# Patient Record
Sex: FEMALE | Race: WHITE | NOT HISPANIC OR LATINO
[De-identification: names, ages, dates, MRNs, and addresses within clinical notes are randomized per-mention and may not be internally consistent; named-entity substitution may affect disease eponyms.]

---

## 2022-01-26 ENCOUNTER — NON-APPOINTMENT (OUTPATIENT)
Age: 47
End: 2022-01-26

## 2022-01-27 ENCOUNTER — APPOINTMENT (OUTPATIENT)
Dept: BREAST CENTER | Facility: CLINIC | Age: 47
End: 2022-01-27
Payer: COMMERCIAL

## 2022-01-27 VITALS
HEART RATE: 81 BPM | WEIGHT: 207 LBS | DIASTOLIC BLOOD PRESSURE: 98 MMHG | SYSTOLIC BLOOD PRESSURE: 163 MMHG | BODY MASS INDEX: 31.37 KG/M2 | HEIGHT: 68 IN

## 2022-01-27 DIAGNOSIS — Z78.9 OTHER SPECIFIED HEALTH STATUS: ICD-10-CM

## 2022-01-27 DIAGNOSIS — Z80.8 FAMILY HISTORY OF MALIGNANT NEOPLASM OF OTHER ORGANS OR SYSTEMS: ICD-10-CM

## 2022-01-27 DIAGNOSIS — Z83.3 FAMILY HISTORY OF DIABETES MELLITUS: ICD-10-CM

## 2022-01-27 PROCEDURE — 99205 OFFICE O/P NEW HI 60 MIN: CPT

## 2022-01-29 PROBLEM — Z83.3 FAMILY HISTORY OF DIABETES MELLITUS: Status: ACTIVE | Noted: 2022-01-27

## 2022-01-29 PROBLEM — Z80.8 FAMILY HISTORY OF NEUROBLASTOMA: Status: ACTIVE | Noted: 2022-01-29

## 2022-01-29 PROBLEM — Z78.9 CONSUMES ALCOHOL WEEKLY: Status: ACTIVE | Noted: 2022-01-27

## 2022-01-29 PROBLEM — Z80.8 FAMILY HISTORY OF MALIGNANT NEOPLASM OF BRAIN: Status: ACTIVE | Noted: 2022-01-27

## 2022-01-29 PROBLEM — Z78.9 DOES NOT USE ILLICIT DRUGS: Status: ACTIVE | Noted: 2022-01-27

## 2022-02-03 ENCOUNTER — APPOINTMENT (OUTPATIENT)
Dept: PLASTIC SURGERY | Facility: CLINIC | Age: 47
End: 2022-02-03
Payer: COMMERCIAL

## 2022-02-03 VITALS — TEMPERATURE: 98.4 F | HEIGHT: 68 IN | WEIGHT: 206 LBS | BODY MASS INDEX: 31.22 KG/M2

## 2022-02-03 PROCEDURE — 99204 OFFICE O/P NEW MOD 45 MIN: CPT

## 2022-02-08 ENCOUNTER — APPOINTMENT (OUTPATIENT)
Dept: BREAST CENTER | Facility: CLINIC | Age: 47
End: 2022-02-08
Payer: COMMERCIAL

## 2022-02-08 VITALS
DIASTOLIC BLOOD PRESSURE: 93 MMHG | HEART RATE: 69 BPM | HEIGHT: 68 IN | WEIGHT: 206 LBS | BODY MASS INDEX: 31.22 KG/M2 | SYSTOLIC BLOOD PRESSURE: 141 MMHG

## 2022-02-08 PROCEDURE — 99214 OFFICE O/P EST MOD 30 MIN: CPT

## 2022-02-14 ENCOUNTER — RESULT REVIEW (OUTPATIENT)
Age: 47
End: 2022-02-14

## 2022-02-14 ENCOUNTER — OUTPATIENT (OUTPATIENT)
Dept: OUTPATIENT SERVICES | Facility: HOSPITAL | Age: 47
LOS: 1 days | End: 2022-02-14
Payer: COMMERCIAL

## 2022-02-14 ENCOUNTER — APPOINTMENT (OUTPATIENT)
Dept: MRI IMAGING | Facility: CLINIC | Age: 47
End: 2022-02-14
Payer: COMMERCIAL

## 2022-02-14 DIAGNOSIS — C50.912 MALIGNANT NEOPLASM OF UNSPECIFIED SITE OF LEFT FEMALE BREAST: ICD-10-CM

## 2022-02-14 PROCEDURE — 88321 CONSLTJ&REPRT SLD PREP ELSWR: CPT

## 2022-02-14 PROCEDURE — 72198 MR ANGIO PELVIS W/O & W/DYE: CPT | Mod: 26

## 2022-02-14 PROCEDURE — C8902: CPT

## 2022-02-14 PROCEDURE — A9585: CPT

## 2022-02-14 PROCEDURE — C8920: CPT

## 2022-02-14 PROCEDURE — 74185 MRA ABD W OR W/O CNTRST: CPT | Mod: 26

## 2022-02-15 DIAGNOSIS — C50.912 MALIGNANT NEOPLASM OF UNSPECIFIED SITE OF LEFT FEMALE BREAST: ICD-10-CM

## 2022-02-21 ENCOUNTER — TRANSCRIPTION ENCOUNTER (OUTPATIENT)
Age: 47
End: 2022-02-21

## 2022-02-24 ENCOUNTER — APPOINTMENT (OUTPATIENT)
Dept: BREAST CENTER | Facility: CLINIC | Age: 47
End: 2022-02-24
Payer: COMMERCIAL

## 2022-02-24 VITALS — DIASTOLIC BLOOD PRESSURE: 89 MMHG | SYSTOLIC BLOOD PRESSURE: 148 MMHG

## 2022-02-24 VITALS
BODY MASS INDEX: 30.16 KG/M2 | SYSTOLIC BLOOD PRESSURE: 150 MMHG | DIASTOLIC BLOOD PRESSURE: 86 MMHG | HEART RATE: 65 BPM | WEIGHT: 199 LBS | HEIGHT: 68 IN

## 2022-02-24 PROCEDURE — 99213 OFFICE O/P EST LOW 20 MIN: CPT

## 2022-02-24 NOTE — PHYSICAL EXAM
[Normocephalic] : normocephalic [Sclera nonicteric] : sclera nonicteric [Examined in the supine and seated position] : examined in the supine and seated position [Bra Size: ___] : Bra Size: [unfilled] [No dominant masses] : no dominant masses in right breast  [No Nipple Retraction] : no left nipple retraction [No Nipple Discharge] : no left nipple discharge [No Axillary Lymphadenopathy] : no left axillary lymphadenopathy [No Edema] : no edema [No Rashes] : no rashes [No Ulceration] : no ulceration [de-identified] : No suspicious dermal lesions on exam, including at L breast 3:00 N9 where there was MRI enhancement of the skin [de-identified] : Slight bulge visible from 2 cm mobile lump in UOQ close to but not involving the skin. No warmth or tenderness or erythema

## 2022-02-24 NOTE — DATA REVIEWED
[FreeTextEntry1] : I have independently reviewed the reports and the images. \par \par B/l mammogram and US 1/11/22\par - heterogenously dense\par - 2 cm mass at area of concern in L UOQ; correlates to a 1.8 x 1.3 x 2.1 cm nodule at 1:00 N10; US bx\par - benign R intramammary node\par - R breast cyst\par - no findings in L axilla\par - BIRADS 4\par \par US needle bx 1/13/22\par - L breast 1:00, q clip = infiltrating ductal carcinoma , 6/9, ER 90%, [place] 73%, Her2 0\par \par Breast MRI 2/1/22\par - 3 cm nonmass enhancement in R breast 9:00 N9; MR bx\par - 0.6 cm enhancing mass in R breast 7:00 N6; MR bx\par - 2 x 2 x 3 cm enhancement in L breast 1:00 N9 at biopsied cancer\par - 0.3 cm enhancement in L breast skin at 3:00 N9; clinical exam\par - questionable L axillary nodes; L axillary  US

## 2022-02-24 NOTE — HISTORY OF PRESENT ILLNESS
[FreeTextEntry1] : Ms. Colby is a 46 year old woman here for a followup for a newly diagnosed left breast infiltrating ductal carcinoma and abnormal MRI findings in the right breast. She woke up in November after a dream and felt a lump in the left outer breast. Earlier this week, the left breast lump seemed to be larger and was red, and since then the swelling and redness have subsided.  \par \par Her genetic testing is pending with the provider where it was drawn. Her family history is not significant for any breast cancer.

## 2022-02-25 NOTE — HISTORY OF PRESENT ILLNESS
[FreeTextEntry1] : Ms. Colby is a 46 year old woman here for a 3rd opinion on a newly diagnosed left breast infiltrating ductal carcinoma. She woke up in November after a dream and felt a lump in the left outer breast. No other breast lumps, nipple discharge or skin changes. \par \par Her family history is not significant for any breast cancer.

## 2022-02-25 NOTE — PAST MEDICAL HISTORY
[Menarche Age ____] : age at menarche was [unfilled] [Total Preg ___] : G[unfilled] [Live Births ___] : P[unfilled]  [Age At Live Birth ___] : Age at live birth: [unfilled] [History of Hormone Replacement Treatment] : has no history of hormone replacement treatment [FreeTextEntry8] : 6 yr

## 2022-02-25 NOTE — DATA REVIEWED
[FreeTextEntry1] : I have independently reviewed the reports and the images. \par \par B/l mammogram and US 1/11/22\par - heterogenously dense\par - 2 cm mass at area of concern in L UOQ; correlates to a 1.8 x 1.3 x 2.1 cm nodule at 1:00 N10; US bx\par - benign R intramammary node\par - R breast cyst\par - no findings in L axilla\par - BIRADS 4\par \par US needle bx 1/13/22\par - L breast 1:00, q clip = infiltrating ductal carcinoma , 6/9, ER 90%, [place] 73%, Her2 0

## 2022-02-25 NOTE — PHYSICAL EXAM
[Normocephalic] : normocephalic [Sclera nonicteric] : sclera nonicteric [Supple] : supple [No Supraclavicular Adenopathy] : no supraclavicular adenopathy [No Cervical Adenopathy] : no cervical adenopathy [Clear to Auscultation Bilat] : clear to auscultation bilaterally [Normal Sinus Rhythm] : normal sinus rhythm [No Rubs] : no pericardial rub [Examined in the supine and seated position] : examined in the supine and seated position [Bra Size: ___] : Bra Size: [unfilled] [No dominant masses] : no dominant masses in right breast  [No Nipple Retraction] : no left nipple retraction [No Nipple Discharge] : no left nipple discharge [No Axillary Lymphadenopathy] : no left axillary lymphadenopathy [Soft] : abdomen soft [No Edema] : no edema [No Rashes] : no rashes [No Ulceration] : no ulceration [de-identified] : 2 cm mass in UOQ

## 2022-02-25 NOTE — HISTORY OF PRESENT ILLNESS
[FreeTextEntry1] : Ms. Colby is a 46 year old woman here for a followup for a newly diagnosed left breast infiltrating ductal carcinoma and abnormal MRI findings in the right breast. She woke up in November after a dream and felt a lump in the left outer breast. No other breast lumps, nipple discharge or skin changes. Since the last office visit, she has met with Dr. Ramirez and decided on AMINA flap reconstruction. Her breast MRI was completed, and a left axillary US will be scheduled. \par \par Her genetic testing is pending with the provider where it was drawn; it should be back in the next week or two. Her family history is not significant for any breast cancer.

## 2022-02-25 NOTE — DATA REVIEWED
[FreeTextEntry1] : I have independently reviewed the reports and the images. \par \par B/l mammogram and US 1/11/22\par - heterogenously dense\par - 2 cm mass at area of concern in L UOQ; correlates to a 1.8 x 1.3 x 2.1 cm nodule at 1:00 N10; US bx\par - benign R intramammary node\par - R breast cyst\par - no findings in L axilla\par - BIRADS 4\par \par US needle bx 1/13/22\par - L breast 1:00, q clip = infiltrating ductal carcinoma , 6/9, ER 90%, [place] 73%, Her2 0\par \par Breast MRI 2/1/22\par - 3 cm nonmass enhancement in R breast 9:00 N9; MR bx\par - 0.6 cm enhancing mass in R breast 7:00 N6; MR bx\par - 2 x 2 x 3 cm enhancement in L breast 1:00 N9 at biopsied cancer\par - 0.3 cm enhancement in L breast skin at 3:00 N9; clinical exam\par - questionable L axillary nodes; L axillary  US

## 2022-02-25 NOTE — CONSULT LETTER
[Dear  ___] : Dear  [unfilled], [Consult Letter:] : I had the pleasure of evaluating your patient, [unfilled]. [Please see my note below.] : Please see my note below. [Consult Closing:] : Thank you very much for allowing me to participate in the care of this patient.  If you have any questions, please do not hesitate to contact me. [Sincerely,] : Sincerely, [DrTutu  ___] : Dr. KNIGHT [FreeTextEntry3] : Lissa Mederos MD FACS

## 2022-02-25 NOTE — PHYSICAL EXAM
[Normocephalic] : normocephalic [Sclera nonicteric] : sclera nonicteric [Supple] : supple [No Supraclavicular Adenopathy] : no supraclavicular adenopathy [No Cervical Adenopathy] : no cervical adenopathy [Clear to Auscultation Bilat] : clear to auscultation bilaterally [Normal Sinus Rhythm] : normal sinus rhythm [No Rubs] : no pericardial rub [Examined in the supine and seated position] : examined in the supine and seated position [Bra Size: ___] : Bra Size: [unfilled] [No dominant masses] : no dominant masses in right breast  [No dominant masses] : no dominant masses left breast [No Nipple Retraction] : no left nipple retraction [No Nipple Discharge] : no left nipple discharge [No Axillary Lymphadenopathy] : no left axillary lymphadenopathy [Soft] : abdomen soft [No Edema] : no edema [No Rashes] : no rashes [No Ulceration] : no ulceration [de-identified] : No suspicious dermal lesions on exam, including at L breast 3:00 N9 where there was MRI enhancement of the skin [de-identified] : 2 cm mass in UOQ

## 2022-03-02 NOTE — ASSESSMENT
[FreeTextEntry1] : The patient was counseled about reconstructive options following mastectomy, including autologus, prosthetic, and the options of foregoing reconstruction.  Additionally, she was explained the options regarding the timing of reconstruction, including immediate reconstruction at the time of mastectomy or delayed reconstruction at a later date. \par \par The patient was extensively counseled on the operation and the perioperative recoveries of both autologous and prosthetic reconstructions.  The patient understands the risks with abdominally based microsurgical reconstruction including partial or total flap loss, revisit to the operating room in the dao-operative period, wound dehiscence, mastectomy skin flap necrosis, fat necrosis, abdominal wall morbidity (laxity, bulge, hernia), asymmetry, parasthesia, seroma, hematoma, and infection.  She also understands the risks with implant-based reconstruction including infection, implant malposition, extrusion, deflation, capsular contracture, mastectomy skin flap necrosis, wound dehiscence, asymmetry, seroma, parasthesia, and hematoma. \par \par The patient understands all of these risks and she provides informed consent.\par \par The plan as of now is for bilateral AMINA flap reconstruction

## 2022-03-02 NOTE — PHYSICAL EXAM
[NI] : Normal [de-identified] : please see scanned in breast sheet\par SN-N: L - 31, R - 32\par N-IMF: L - 19, R - 18\par BW: L/R - 15\par grade 3 ptosis b/l  [de-identified] : C cup tissue

## 2022-03-02 NOTE — HISTORY OF PRESENT ILLNESS
[FreeTextEntry1] : Ms. NIKKO KOVACS  is a 46 year  old female  with no pertinent past medical history who presents with newly diagnosed left breast cancer which was found on self exam in November.  Pt had mammogram 1/11/22, and later confirmed IDC with ultrasound guided core biopsy 1/13/22  pt was referred to the office by Dr Mederos  to discuss her reconstructive options. \par \par smoking status: no smoker\par anticoagulation: none\par history of bleeding disorder: negative \par family history of breast cancer: negative\par \par At this time pt is considering bilateral mastectomy\par 
37.1

## 2022-03-07 ENCOUNTER — OUTPATIENT (OUTPATIENT)
Dept: OUTPATIENT SERVICES | Facility: HOSPITAL | Age: 47
LOS: 1 days | End: 2022-03-07
Payer: COMMERCIAL

## 2022-03-07 DIAGNOSIS — Z98.890 OTHER SPECIFIED POSTPROCEDURAL STATES: Chronic | ICD-10-CM

## 2022-03-07 DIAGNOSIS — Z01.818 ENCOUNTER FOR OTHER PREPROCEDURAL EXAMINATION: ICD-10-CM

## 2022-03-07 DIAGNOSIS — K08.409 PARTIAL LOSS OF TEETH, UNSPECIFIED CAUSE, UNSPECIFIED CLASS: Chronic | ICD-10-CM

## 2022-03-07 DIAGNOSIS — C50.912 MALIGNANT NEOPLASM OF UNSPECIFIED SITE OF LEFT FEMALE BREAST: ICD-10-CM

## 2022-03-07 LAB
ALBUMIN SERPL ELPH-MCNC: 4 G/DL — SIGNIFICANT CHANGE UP (ref 3.3–5)
ALP SERPL-CCNC: 79 U/L — SIGNIFICANT CHANGE UP (ref 40–120)
ALT FLD-CCNC: 16 U/L — SIGNIFICANT CHANGE UP (ref 12–78)
ANION GAP SERPL CALC-SCNC: 4 MMOL/L — LOW (ref 5–17)
APPEARANCE UR: CLEAR — SIGNIFICANT CHANGE UP
APTT BLD: 31.9 SEC — SIGNIFICANT CHANGE UP (ref 27.5–35.5)
AST SERPL-CCNC: 9 U/L — LOW (ref 15–37)
BASOPHILS # BLD AUTO: 0.05 K/UL — SIGNIFICANT CHANGE UP (ref 0–0.2)
BASOPHILS NFR BLD AUTO: 1 % — SIGNIFICANT CHANGE UP (ref 0–2)
BILIRUB SERPL-MCNC: 0.4 MG/DL — SIGNIFICANT CHANGE UP (ref 0.2–1.2)
BILIRUB UR-MCNC: NEGATIVE — SIGNIFICANT CHANGE UP
BUN SERPL-MCNC: 10 MG/DL — SIGNIFICANT CHANGE UP (ref 7–23)
CALCIUM SERPL-MCNC: 9.3 MG/DL — SIGNIFICANT CHANGE UP (ref 8.5–10.1)
CHLORIDE SERPL-SCNC: 111 MMOL/L — HIGH (ref 96–108)
CO2 SERPL-SCNC: 26 MMOL/L — SIGNIFICANT CHANGE UP (ref 22–31)
COLOR SPEC: YELLOW — SIGNIFICANT CHANGE UP
CREAT SERPL-MCNC: 0.98 MG/DL — SIGNIFICANT CHANGE UP (ref 0.5–1.3)
DIFF PNL FLD: NEGATIVE — SIGNIFICANT CHANGE UP
EGFR: 73 ML/MIN/1.73M2 — SIGNIFICANT CHANGE UP
EOSINOPHIL # BLD AUTO: 0.15 K/UL — SIGNIFICANT CHANGE UP (ref 0–0.5)
EOSINOPHIL NFR BLD AUTO: 2.9 % — SIGNIFICANT CHANGE UP (ref 0–6)
GLUCOSE SERPL-MCNC: 90 MG/DL — SIGNIFICANT CHANGE UP (ref 70–99)
GLUCOSE UR QL: NEGATIVE — SIGNIFICANT CHANGE UP
HCT VFR BLD CALC: 42.9 % — SIGNIFICANT CHANGE UP (ref 34.5–45)
HGB BLD-MCNC: 14.6 G/DL — SIGNIFICANT CHANGE UP (ref 11.5–15.5)
IMM GRANULOCYTES NFR BLD AUTO: 0.2 % — SIGNIFICANT CHANGE UP (ref 0–1.5)
INR BLD: 1.03 RATIO — SIGNIFICANT CHANGE UP (ref 0.88–1.16)
KETONES UR-MCNC: NEGATIVE — SIGNIFICANT CHANGE UP
LEUKOCYTE ESTERASE UR-ACNC: NEGATIVE — SIGNIFICANT CHANGE UP
LYMPHOCYTES # BLD AUTO: 2.15 K/UL — SIGNIFICANT CHANGE UP (ref 1–3.3)
LYMPHOCYTES # BLD AUTO: 41.3 % — SIGNIFICANT CHANGE UP (ref 13–44)
MCHC RBC-ENTMCNC: 31.7 PG — SIGNIFICANT CHANGE UP (ref 27–34)
MCHC RBC-ENTMCNC: 34 GM/DL — SIGNIFICANT CHANGE UP (ref 32–36)
MCV RBC AUTO: 93.1 FL — SIGNIFICANT CHANGE UP (ref 80–100)
MONOCYTES # BLD AUTO: 0.31 K/UL — SIGNIFICANT CHANGE UP (ref 0–0.9)
MONOCYTES NFR BLD AUTO: 6 % — SIGNIFICANT CHANGE UP (ref 2–14)
NEUTROPHILS # BLD AUTO: 2.54 K/UL — SIGNIFICANT CHANGE UP (ref 1.8–7.4)
NEUTROPHILS NFR BLD AUTO: 48.6 % — SIGNIFICANT CHANGE UP (ref 43–77)
NITRITE UR-MCNC: NEGATIVE — SIGNIFICANT CHANGE UP
PH UR: 6.5 — SIGNIFICANT CHANGE UP (ref 5–8)
PLATELET # BLD AUTO: 352 K/UL — SIGNIFICANT CHANGE UP (ref 150–400)
POTASSIUM SERPL-MCNC: 3.9 MMOL/L — SIGNIFICANT CHANGE UP (ref 3.5–5.3)
POTASSIUM SERPL-SCNC: 3.9 MMOL/L — SIGNIFICANT CHANGE UP (ref 3.5–5.3)
PROT SERPL-MCNC: 7.5 GM/DL — SIGNIFICANT CHANGE UP (ref 6–8.3)
PROT UR-MCNC: NEGATIVE — SIGNIFICANT CHANGE UP
PROTHROM AB SERPL-ACNC: 12 SEC — SIGNIFICANT CHANGE UP (ref 10.5–13.4)
RBC # BLD: 4.61 M/UL — SIGNIFICANT CHANGE UP (ref 3.8–5.2)
RBC # FLD: 11.9 % — SIGNIFICANT CHANGE UP (ref 10.3–14.5)
SODIUM SERPL-SCNC: 141 MMOL/L — SIGNIFICANT CHANGE UP (ref 135–145)
SP GR SPEC: 1.01 — SIGNIFICANT CHANGE UP (ref 1.01–1.02)
UROBILINOGEN FLD QL: NEGATIVE — SIGNIFICANT CHANGE UP
WBC # BLD: 5.21 K/UL — SIGNIFICANT CHANGE UP (ref 3.8–10.5)
WBC # FLD AUTO: 5.21 K/UL — SIGNIFICANT CHANGE UP (ref 3.8–10.5)

## 2022-03-07 PROCEDURE — 85730 THROMBOPLASTIN TIME PARTIAL: CPT

## 2022-03-07 PROCEDURE — 86900 BLOOD TYPING SEROLOGIC ABO: CPT

## 2022-03-07 PROCEDURE — 86901 BLOOD TYPING SEROLOGIC RH(D): CPT

## 2022-03-07 PROCEDURE — 81003 URINALYSIS AUTO W/O SCOPE: CPT

## 2022-03-07 PROCEDURE — 93010 ELECTROCARDIOGRAM REPORT: CPT

## 2022-03-07 PROCEDURE — 85025 COMPLETE CBC W/AUTO DIFF WBC: CPT

## 2022-03-07 PROCEDURE — 93005 ELECTROCARDIOGRAM TRACING: CPT

## 2022-03-07 PROCEDURE — 36415 COLL VENOUS BLD VENIPUNCTURE: CPT

## 2022-03-07 PROCEDURE — 80053 COMPREHEN METABOLIC PANEL: CPT

## 2022-03-07 PROCEDURE — 86850 RBC ANTIBODY SCREEN: CPT

## 2022-03-07 PROCEDURE — 85610 PROTHROMBIN TIME: CPT

## 2022-03-07 NOTE — CHART NOTE - NSCHARTNOTEFT_GEN_A_CORE
45 y/o female presents to PST for scheduled bilateral mastectomy with DR. DARNELL and AMINA reconstruction with Dr. Millan. Dr Ramirez on 3/14/22.    vs 65 hr b/p 152/85 temp 98.6    cbc, bmp, type and screen, ptt/inr done today in PST     DCIS   Pre op and chlorhexidine instructions given and explained.  Avoid NSAIDs and OTC supplements.   Patient verbalized understanding  medical consult requested by surgeon 3/1/22  covid 19 swab scheduled 3/11/22  , advised to quarantine after test     HTN   continue to amlodipine

## 2022-03-08 DIAGNOSIS — C50.912 MALIGNANT NEOPLASM OF UNSPECIFIED SITE OF LEFT FEMALE BREAST: ICD-10-CM

## 2022-03-08 DIAGNOSIS — Z01.818 ENCOUNTER FOR OTHER PREPROCEDURAL EXAMINATION: ICD-10-CM

## 2022-03-11 PROBLEM — I10 ESSENTIAL (PRIMARY) HYPERTENSION: Chronic | Status: ACTIVE | Noted: 2022-03-07

## 2022-03-11 PROBLEM — D05.12 INTRADUCTAL CARCINOMA IN SITU OF LEFT BREAST: Chronic | Status: ACTIVE | Noted: 2022-03-07

## 2022-03-11 NOTE — PAST MEDICAL HISTORY
[Menarche Age ____] : age at menarche was [unfilled] [History of Hormone Replacement Treatment] : has no history of hormone replacement treatment [Total Preg ___] : G[unfilled] [Live Births ___] : P[unfilled]  [Age At Live Birth ___] : Age at live birth: [unfilled] [FreeTextEntry8] : 6 yr

## 2022-03-11 NOTE — HISTORY OF PRESENT ILLNESS
[FreeTextEntry1] : Ms. Colby is a 46 year old woman here for a surgical treatment for left breast infiltrating ductal carcinoma and abnormal MRI findings in the right breast. She woke up in November after a dream and felt a lump in the left outer breast. \par \par Her genetic panel testing is negative. Her family history is not significant for any breast cancer.

## 2022-03-11 NOTE — PHYSICAL EXAM
[Normocephalic] : normocephalic [Sclera nonicteric] : sclera nonicteric [Supple] : supple [No Supraclavicular Adenopathy] : no supraclavicular adenopathy [No Cervical Adenopathy] : no cervical adenopathy [Clear to Auscultation Bilat] : clear to auscultation bilaterally [Normal Sinus Rhythm] : normal sinus rhythm [Examined in the supine and seated position] : examined in the supine and seated position [Bra Size: ___] : Bra Size: [unfilled] [No dominant masses] : no dominant masses in right breast  [No Nipple Retraction] : no left nipple retraction [No Nipple Discharge] : no left nipple discharge [No Axillary Lymphadenopathy] : no left axillary lymphadenopathy [Soft] : abdomen soft [No Edema] : no edema [No Rashes] : no rashes [No Ulceration] : no ulceration [de-identified] : No suspicious dermal lesions on exam, including at L breast 3:00 N9 where there was MRI enhancement of the skin [de-identified] : Slight bulge visible from 2 cm mobile lump in UOQ close to but not involving the skin. No warmth or tenderness or erythema

## 2022-03-14 ENCOUNTER — RESULT REVIEW (OUTPATIENT)
Age: 47
End: 2022-03-14

## 2022-03-14 ENCOUNTER — APPOINTMENT (OUTPATIENT)
Dept: PLASTIC SURGERY | Facility: HOSPITAL | Age: 47
End: 2022-03-14
Payer: COMMERCIAL

## 2022-03-14 ENCOUNTER — NON-APPOINTMENT (OUTPATIENT)
Age: 47
End: 2022-03-14

## 2022-03-14 ENCOUNTER — INPATIENT (INPATIENT)
Facility: HOSPITAL | Age: 47
LOS: 1 days | Discharge: HOME | DRG: 580 | End: 2022-03-16
Attending: SURGERY | Admitting: SURGERY
Payer: COMMERCIAL

## 2022-03-14 ENCOUNTER — APPOINTMENT (OUTPATIENT)
Dept: BREAST CENTER | Facility: HOSPITAL | Age: 47
End: 2022-03-14

## 2022-03-14 VITALS
RESPIRATION RATE: 16 BRPM | DIASTOLIC BLOOD PRESSURE: 82 MMHG | WEIGHT: 199.08 LBS | HEIGHT: 68 IN | HEART RATE: 73 BPM | OXYGEN SATURATION: 100 % | SYSTOLIC BLOOD PRESSURE: 127 MMHG | TEMPERATURE: 98 F

## 2022-03-14 DIAGNOSIS — Z98.890 OTHER SPECIFIED POSTPROCEDURAL STATES: Chronic | ICD-10-CM

## 2022-03-14 DIAGNOSIS — I10 ESSENTIAL (PRIMARY) HYPERTENSION: ICD-10-CM

## 2022-03-14 DIAGNOSIS — C50.912 MALIGNANT NEOPLASM OF UNSPECIFIED SITE OF LEFT FEMALE BREAST: ICD-10-CM

## 2022-03-14 DIAGNOSIS — C50.812 MALIGNANT NEOPLASM OF OVERLAPPING SITES OF LEFT FEMALE BREAST: ICD-10-CM

## 2022-03-14 DIAGNOSIS — C77.3 SECONDARY AND UNSPECIFIED MALIGNANT NEOPLASM OF AXILLA AND UPPER LIMB LYMPH NODES: ICD-10-CM

## 2022-03-14 DIAGNOSIS — K08.409 PARTIAL LOSS OF TEETH, UNSPECIFIED CAUSE, UNSPECIFIED CLASS: Chronic | ICD-10-CM

## 2022-03-14 LAB — HCG UR QL: NEGATIVE — SIGNIFICANT CHANGE UP

## 2022-03-14 PROCEDURE — S2068: CPT | Mod: 82,RT

## 2022-03-14 PROCEDURE — 81025 URINE PREGNANCY TEST: CPT

## 2022-03-14 PROCEDURE — 19303 MAST SIMPLE COMPLETE: CPT | Mod: 50

## 2022-03-14 PROCEDURE — 38530 BIOPSY/REMOVAL LYMPH NODES: CPT | Mod: RT

## 2022-03-14 PROCEDURE — 21600 PARTIAL REMOVAL OF RIB: CPT | Mod: RT,59

## 2022-03-14 PROCEDURE — S2068: CPT | Mod: LT

## 2022-03-14 PROCEDURE — 21600 PARTIAL REMOVAL OF RIB: CPT | Mod: LT,59

## 2022-03-14 PROCEDURE — 88307 TISSUE EXAM BY PATHOLOGIST: CPT

## 2022-03-14 PROCEDURE — 38530 BIOPSY/REMOVAL LYMPH NODES: CPT | Mod: LT

## 2022-03-14 PROCEDURE — 15777 ACELLULAR DERM MATRIX IMPLT: CPT | Mod: RT

## 2022-03-14 PROCEDURE — 38525 BIOPSY/REMOVAL LYMPH NODES: CPT | Mod: 50

## 2022-03-14 PROCEDURE — C1781: CPT

## 2022-03-14 PROCEDURE — 85027 COMPLETE CBC AUTOMATED: CPT

## 2022-03-14 PROCEDURE — 80048 BASIC METABOLIC PNL TOTAL CA: CPT

## 2022-03-14 PROCEDURE — 99024 POSTOP FOLLOW-UP VISIT: CPT

## 2022-03-14 PROCEDURE — C9399: CPT

## 2022-03-14 PROCEDURE — 88333 PATH CONSLTJ SURG CYTO XM 1: CPT

## 2022-03-14 PROCEDURE — 88333 PATH CONSLTJ SURG CYTO XM 1: CPT | Mod: 26

## 2022-03-14 PROCEDURE — A9520: CPT

## 2022-03-14 PROCEDURE — 38792 RA TRACER ID OF SENTINL NODE: CPT | Mod: 50,59

## 2022-03-14 PROCEDURE — 38900 IO MAP OF SENT LYMPH NODE: CPT | Mod: 50

## 2022-03-14 PROCEDURE — 15777 ACELLULAR DERM MATRIX IMPLT: CPT | Mod: LT

## 2022-03-14 PROCEDURE — S2068: CPT | Mod: RT

## 2022-03-14 PROCEDURE — C1889: CPT

## 2022-03-14 PROCEDURE — 88305 TISSUE EXAM BY PATHOLOGIST: CPT

## 2022-03-14 PROCEDURE — 88305 TISSUE EXAM BY PATHOLOGIST: CPT | Mod: 26

## 2022-03-14 PROCEDURE — 36415 COLL VENOUS BLD VENIPUNCTURE: CPT

## 2022-03-14 PROCEDURE — 99261: CPT

## 2022-03-14 PROCEDURE — 88307 TISSUE EXAM BY PATHOLOGIST: CPT | Mod: 26

## 2022-03-14 RX ORDER — HYDROMORPHONE HYDROCHLORIDE 2 MG/ML
0.5 INJECTION INTRAMUSCULAR; INTRAVENOUS; SUBCUTANEOUS EVERY 4 HOURS
Refills: 0 | Status: DISCONTINUED | OUTPATIENT
Start: 2022-03-14 | End: 2022-03-16

## 2022-03-14 RX ORDER — SODIUM CHLORIDE 9 MG/ML
1000 INJECTION, SOLUTION INTRAVENOUS
Refills: 0 | Status: DISCONTINUED | OUTPATIENT
Start: 2022-03-14 | End: 2022-03-16

## 2022-03-14 RX ORDER — ACETAMINOPHEN 500 MG
650 TABLET ORAL EVERY 6 HOURS
Refills: 0 | Status: COMPLETED | OUTPATIENT
Start: 2022-03-14 | End: 2023-02-10

## 2022-03-14 RX ORDER — SENNA PLUS 8.6 MG/1
2 TABLET ORAL AT BEDTIME
Refills: 0 | Status: DISCONTINUED | OUTPATIENT
Start: 2022-03-14 | End: 2022-03-16

## 2022-03-14 RX ORDER — ACETAMINOPHEN 500 MG
1000 TABLET ORAL EVERY 6 HOURS
Refills: 0 | Status: COMPLETED | OUTPATIENT
Start: 2022-03-14 | End: 2022-03-15

## 2022-03-14 RX ORDER — KETOROLAC TROMETHAMINE 30 MG/ML
10 SYRINGE (ML) INJECTION EVERY 6 HOURS
Refills: 0 | Status: DISCONTINUED | OUTPATIENT
Start: 2022-03-14 | End: 2022-03-14

## 2022-03-14 RX ORDER — CEFAZOLIN SODIUM 1 G
2000 VIAL (EA) INJECTION EVERY 8 HOURS
Refills: 0 | Status: COMPLETED | OUTPATIENT
Start: 2022-03-15 | End: 2022-03-15

## 2022-03-14 RX ORDER — FENTANYL CITRATE 50 UG/ML
50 INJECTION INTRAVENOUS
Refills: 0 | Status: DISCONTINUED | OUTPATIENT
Start: 2022-03-14 | End: 2022-03-15

## 2022-03-14 RX ORDER — SODIUM CHLORIDE 9 MG/ML
1000 INJECTION, SOLUTION INTRAVENOUS
Refills: 0 | Status: DISCONTINUED | OUTPATIENT
Start: 2022-03-14 | End: 2022-03-15

## 2022-03-14 RX ORDER — OXYCODONE HYDROCHLORIDE 5 MG/1
10 TABLET ORAL EVERY 4 HOURS
Refills: 0 | Status: DISCONTINUED | OUTPATIENT
Start: 2022-03-14 | End: 2022-03-16

## 2022-03-14 RX ORDER — OXYCODONE HYDROCHLORIDE 5 MG/1
5 TABLET ORAL EVERY 4 HOURS
Refills: 0 | Status: DISCONTINUED | OUTPATIENT
Start: 2022-03-14 | End: 2022-03-16

## 2022-03-14 RX ORDER — KETOROLAC TROMETHAMINE 30 MG/ML
15 SYRINGE (ML) INJECTION EVERY 6 HOURS
Refills: 0 | Status: DISCONTINUED | OUTPATIENT
Start: 2022-03-14 | End: 2022-03-16

## 2022-03-14 RX ORDER — ONDANSETRON 8 MG/1
4 TABLET, FILM COATED ORAL EVERY 6 HOURS
Refills: 0 | Status: DISCONTINUED | OUTPATIENT
Start: 2022-03-14 | End: 2022-03-16

## 2022-03-14 RX ORDER — INFLUENZA VIRUS VACCINE 15; 15; 15; 15 UG/.5ML; UG/.5ML; UG/.5ML; UG/.5ML
0.5 SUSPENSION INTRAMUSCULAR ONCE
Refills: 0 | Status: COMPLETED | OUTPATIENT
Start: 2022-03-14 | End: 2022-03-14

## 2022-03-14 RX ORDER — HEPARIN SODIUM 5000 [USP'U]/ML
5000 INJECTION INTRAVENOUS; SUBCUTANEOUS EVERY 8 HOURS
Refills: 0 | Status: DISCONTINUED | OUTPATIENT
Start: 2022-03-14 | End: 2022-03-16

## 2022-03-14 RX ORDER — ONDANSETRON 8 MG/1
4 TABLET, FILM COATED ORAL ONCE
Refills: 0 | Status: DISCONTINUED | OUTPATIENT
Start: 2022-03-14 | End: 2022-03-15

## 2022-03-14 RX ORDER — OXYCODONE HYDROCHLORIDE 5 MG/1
10 TABLET ORAL ONCE
Refills: 0 | Status: DISCONTINUED | OUTPATIENT
Start: 2022-03-14 | End: 2022-03-14

## 2022-03-14 RX ADMIN — Medication 15 MILLIGRAM(S): at 22:14

## 2022-03-14 RX ADMIN — OXYCODONE HYDROCHLORIDE 10 MILLIGRAM(S): 5 TABLET ORAL at 20:00

## 2022-03-14 RX ADMIN — HEPARIN SODIUM 5000 UNIT(S): 5000 INJECTION INTRAVENOUS; SUBCUTANEOUS at 22:14

## 2022-03-14 RX ADMIN — Medication 400 MILLIGRAM(S): at 22:14

## 2022-03-14 RX ADMIN — OXYCODONE HYDROCHLORIDE 10 MILLIGRAM(S): 5 TABLET ORAL at 21:00

## 2022-03-14 RX ADMIN — SODIUM CHLORIDE 75 MILLILITER(S): 9 INJECTION, SOLUTION INTRAVENOUS at 18:07

## 2022-03-14 NOTE — BRIEF OPERATIVE NOTE - NSICDXBRIEFOPLAUNCH_GEN_ALL_CORE
<--- Click to Launch ICDx for PreOp, PostOp and Procedure
<--- Click to Launch ICDx for PreOp, PostOp and Procedure
There are no Wet Read(s) to document.

## 2022-03-14 NOTE — PATIENT PROFILE ADULT - FALL HARM RISK - UNIVERSAL INTERVENTIONS
Bed in lowest position, wheels locked, appropriate side rails in place/Call bell, personal items and telephone in reach/Instruct patient to call for assistance before getting out of bed or chair/Non-slip footwear when patient is out of bed/[place] to call system/Physically safe environment - no spills, clutter or unnecessary equipment/Purposeful Proactive Rounding/Room/bathroom lighting operational, light cord in reach

## 2022-03-14 NOTE — BRIEF OPERATIVE NOTE - SPECIMENS
R mastectomy, R axillary sentinel node #1 hot and magnetic, #2 hot and magnetic; L mastectomy, L UOQ anterior margin, L UOQ posterior margin, L axillary sentinel node #1 hot and magnetic, #2 hot R mastectomy, R LOQ posterior margin, R axillary sentinel node #1 hot and magnetic, #2 hot and magnetic; L mastectomy, L UOQ anterior margin, L UOQ posterior margin, L axillary sentinel node #1 hot and magnetic, #2 hot

## 2022-03-14 NOTE — BRIEF OPERATIVE NOTE - NSICDXBRIEFPROCEDURE_GEN_ALL_CORE_FT
PROCEDURES:  Bilat simple mastectomy 14-Mar-2022 13:16:26  Au, Lissa  Biopsy of both axilla 14-Mar-2022 13:17:15  Au, Lissa  Intraoperative sentinel node mapping 14-Mar-2022 13:17:41  Au, Lissa  [place] lymph node mapping 14-Mar-2022 13:18:26  Au, Lissa  
PROCEDURES:  AMINA flap, free 14-Mar-2022 16:58:53  Patricia Galvan

## 2022-03-14 NOTE — BRIEF OPERATIVE NOTE - NSICDXBRIEFPREOP_GEN_ALL_CORE_FT
PRE-OP DIAGNOSIS:  Infiltrating ductal carcinoma of left breast 14-Mar-2022 13:16:39  Lissa Mederos  
PRE-OP DIAGNOSIS:  Abnormal MRI, breast 14-Mar-2022 13:16:47  Lissa Mederos  Infiltrating ductal carcinoma of left breast 14-Mar-2022 13:16:39  Lissa Mederos

## 2022-03-14 NOTE — BRIEF OPERATIVE NOTE - OPERATION/FINDINGS
L mastectomy is 1083 g, and R mastectomy is 1083 g L mastectomy is 1083 g, and R mastectomy is 1083 g. Intraop consult showed the L axillary sentinel nodes #1 and #2 to be negative on touch prep.

## 2022-03-14 NOTE — BRIEF OPERATIVE NOTE - NSICDXBRIEFPOSTOP_GEN_ALL_CORE_FT
POST-OP DIAGNOSIS:  Infiltrating ductal carcinoma of left breast 14-Mar-2022 13:16:54  Lissa Mederos  Abnormal MRI, breast 14-Mar-2022 13:16:50  Lissa Mederos  
POST-OP DIAGNOSIS:  Infiltrating ductal carcinoma of left breast 14-Mar-2022 13:16:54  Lissa Mederos

## 2022-03-15 DIAGNOSIS — C50.919 MALIGNANT NEOPLASM OF UNSPECIFIED SITE OF UNSPECIFIED FEMALE BREAST: ICD-10-CM

## 2022-03-15 LAB
ANION GAP SERPL CALC-SCNC: 6 MMOL/L — SIGNIFICANT CHANGE UP (ref 5–17)
BUN SERPL-MCNC: 12 MG/DL — SIGNIFICANT CHANGE UP (ref 7–23)
CALCIUM SERPL-MCNC: 8.4 MG/DL — LOW (ref 8.5–10.1)
CHLORIDE SERPL-SCNC: 109 MMOL/L — HIGH (ref 96–108)
CO2 SERPL-SCNC: 24 MMOL/L — SIGNIFICANT CHANGE UP (ref 22–31)
CREAT SERPL-MCNC: 0.81 MG/DL — SIGNIFICANT CHANGE UP (ref 0.5–1.3)
EGFR: 92 ML/MIN/1.73M2 — SIGNIFICANT CHANGE UP
GLUCOSE SERPL-MCNC: 104 MG/DL — HIGH (ref 70–99)
HCT VFR BLD CALC: 34.7 % — SIGNIFICANT CHANGE UP (ref 34.5–45)
HGB BLD-MCNC: 11.6 G/DL — SIGNIFICANT CHANGE UP (ref 11.5–15.5)
MCHC RBC-ENTMCNC: 32.3 PG — SIGNIFICANT CHANGE UP (ref 27–34)
MCHC RBC-ENTMCNC: 33.4 GM/DL — SIGNIFICANT CHANGE UP (ref 32–36)
MCV RBC AUTO: 96.7 FL — SIGNIFICANT CHANGE UP (ref 80–100)
PLATELET # BLD AUTO: 257 K/UL — SIGNIFICANT CHANGE UP (ref 150–400)
POTASSIUM SERPL-MCNC: 3.9 MMOL/L — SIGNIFICANT CHANGE UP (ref 3.5–5.3)
POTASSIUM SERPL-SCNC: 3.9 MMOL/L — SIGNIFICANT CHANGE UP (ref 3.5–5.3)
RBC # BLD: 3.59 M/UL — LOW (ref 3.8–5.2)
RBC # FLD: 11.9 % — SIGNIFICANT CHANGE UP (ref 10.3–14.5)
SODIUM SERPL-SCNC: 139 MMOL/L — SIGNIFICANT CHANGE UP (ref 135–145)
WBC # BLD: 10.79 K/UL — HIGH (ref 3.8–10.5)
WBC # FLD AUTO: 10.79 K/UL — HIGH (ref 3.8–10.5)

## 2022-03-15 PROCEDURE — 12345: CPT | Mod: NC

## 2022-03-15 RX ORDER — ACETAMINOPHEN 500 MG
650 TABLET ORAL EVERY 6 HOURS
Refills: 0 | Status: DISCONTINUED | OUTPATIENT
Start: 2022-03-15 | End: 2022-03-16

## 2022-03-15 RX ADMIN — Medication 400 MILLIGRAM(S): at 15:43

## 2022-03-15 RX ADMIN — Medication 400 MILLIGRAM(S): at 09:16

## 2022-03-15 RX ADMIN — HEPARIN SODIUM 5000 UNIT(S): 5000 INJECTION INTRAVENOUS; SUBCUTANEOUS at 20:41

## 2022-03-15 RX ADMIN — Medication 100 MILLIGRAM(S): at 00:10

## 2022-03-15 RX ADMIN — OXYCODONE HYDROCHLORIDE 5 MILLIGRAM(S): 5 TABLET ORAL at 20:45

## 2022-03-15 RX ADMIN — OXYCODONE HYDROCHLORIDE 5 MILLIGRAM(S): 5 TABLET ORAL at 21:15

## 2022-03-15 RX ADMIN — SENNA PLUS 2 TABLET(S): 8.6 TABLET ORAL at 20:41

## 2022-03-15 RX ADMIN — Medication 100 MILLIGRAM(S): at 08:20

## 2022-03-15 RX ADMIN — Medication 400 MILLIGRAM(S): at 04:19

## 2022-03-15 RX ADMIN — Medication 15 MILLIGRAM(S): at 04:19

## 2022-03-15 RX ADMIN — HEPARIN SODIUM 5000 UNIT(S): 5000 INJECTION INTRAVENOUS; SUBCUTANEOUS at 06:19

## 2022-03-15 RX ADMIN — Medication 15 MILLIGRAM(S): at 17:19

## 2022-03-15 RX ADMIN — HEPARIN SODIUM 5000 UNIT(S): 5000 INJECTION INTRAVENOUS; SUBCUTANEOUS at 13:53

## 2022-03-15 RX ADMIN — SENNA PLUS 2 TABLET(S): 8.6 TABLET ORAL at 06:19

## 2022-03-15 RX ADMIN — SODIUM CHLORIDE 75 MILLILITER(S): 9 INJECTION, SOLUTION INTRAVENOUS at 15:43

## 2022-03-15 RX ADMIN — Medication 650 MILLIGRAM(S): at 20:41

## 2022-03-15 RX ADMIN — OXYCODONE HYDROCHLORIDE 5 MILLIGRAM(S): 5 TABLET ORAL at 14:05

## 2022-03-15 RX ADMIN — Medication 15 MILLIGRAM(S): at 10:28

## 2022-03-15 RX ADMIN — Medication 1000 MILLIGRAM(S): at 09:46

## 2022-03-16 ENCOUNTER — TRANSCRIPTION ENCOUNTER (OUTPATIENT)
Age: 47
End: 2022-03-16

## 2022-03-16 VITALS
OXYGEN SATURATION: 97 % | RESPIRATION RATE: 18 BRPM | HEART RATE: 75 BPM | SYSTOLIC BLOOD PRESSURE: 127 MMHG | TEMPERATURE: 98 F | DIASTOLIC BLOOD PRESSURE: 72 MMHG

## 2022-03-16 PROCEDURE — 99223 1ST HOSP IP/OBS HIGH 75: CPT

## 2022-03-16 RX ADMIN — Medication 15 MILLIGRAM(S): at 00:09

## 2022-03-16 RX ADMIN — Medication 1 DROP(S): at 11:59

## 2022-03-16 RX ADMIN — Medication 650 MILLIGRAM(S): at 09:48

## 2022-03-16 RX ADMIN — Medication 15 MILLIGRAM(S): at 11:59

## 2022-03-16 RX ADMIN — HEPARIN SODIUM 5000 UNIT(S): 5000 INJECTION INTRAVENOUS; SUBCUTANEOUS at 04:43

## 2022-03-16 RX ADMIN — Medication 650 MILLIGRAM(S): at 04:42

## 2022-03-16 RX ADMIN — Medication 650 MILLIGRAM(S): at 10:08

## 2022-03-16 RX ADMIN — Medication 1 DROP(S): at 04:43

## 2022-03-16 RX ADMIN — Medication 15 MILLIGRAM(S): at 08:37

## 2022-03-16 NOTE — DISCHARGE NOTE PROVIDER - NSDCFUADDINST_GEN_ALL_CORE_FT
Dressings: Leave dressings intact.     Bathing: Do not get dressings wet for 48 hours.  After 48 hours you may shower, do not scrub directly on the incisions. No dressing is required after shower, pat the area dry gently. Secure drains on a lanyard or a necklace to keep from dangling while showering    Drains: Empty and record the drain amount from each drain separately. Write down Date, Time, and Amount for each drain separately on a piece of paper and bring the paper to the office at your follow up visit.     Activity: No heavy lifting, or strenuous activity, you may walk for exercise    Diet: No change    Meds: You should alternate taking Tylenol and Motrin for pain ever 4-6 hours.  Take Oxycodone for severe pain only.     Follow up: call the office to make a follow up appointment in 1 week

## 2022-03-16 NOTE — DISCHARGE NOTE PROVIDER - NSDCMRMEDTOKEN_GEN_ALL_CORE_FT
amLODIPine 5 mg oral tablet: 1 tab(s) orally once a day  Vitamin D3 400 intl units (10 mcg) oral tablet: 1 tab(s) orally once a day

## 2022-03-16 NOTE — DISCHARGE NOTE PROVIDER - NSDCCPTREATMENT_GEN_ALL_CORE_FT
PRINCIPAL PROCEDURE  Procedure: Skin sparing mastectomy  Findings and Treatment:       SECONDARY PROCEDURE  Procedure: AMINA flap, free  Findings and Treatment:

## 2022-03-16 NOTE — PROGRESS NOTE ADULT - SUBJECTIVE AND OBJECTIVE BOX
47 yo F POD#2 Bilateral mastectomies and AMINA reconstruction, doing well.  Seen by Dr. Ramirez this am.  No complaints overnight. Denies f/c/n/v/SOB/CP. Incisional/abd pain controlled with oral analgesics. Voiding. No BM yet but +Flatus. Vioptix stable overnight and dc'd this am by Dr. Ramirez. Pt expresses she would like to go home today.    Vital Signs Last 24 Hrs  T(C): 36.7 (16 Mar 2022 00:19), Max: 36.8 (15 Mar 2022 15:36)  T(F): 98 (16 Mar 2022 00:19), Max: 98.2 (15 Mar 2022 15:36)  HR: 67 (16 Mar 2022 00:19) (57 - 70)  BP: 106/57 (16 Mar 2022 00:19) (82/52 - 106/57)  BP(mean): --  RR: 18 (16 Mar 2022 00:19) (17 - 18)  SpO2: 96% (16 Mar 2022 00:19) (95% - 98%)    General: INAD A&Ox3  Lungs CTA B/l  Chest: B/l flaps viable and soft. Small region of ecchymosis noted in R breast along vertical incision line  CV: RRR  Abd: soft, incision c/d/i with parinio   ext: nonedematous    A&P: 47 yo F POD#2 bilateral mastectomies and AMINA  ADAT  OOB encourage ambulation  dispo planning home today with OP follow up with Dr. Mederos and Dr. Ramirez  DVT ppx  Pain control  
Pt seen and examined. C/o some pain above right breast.  Denies SOB/CP/N/V.  Laith diet and + flatus     Vital Signs Last 24 Hrs  T(C): 36.8 (15 Mar 2022 07:53), Max: 37.7 (15 Mar 2022 00:32)  T(F): 98.2 (15 Mar 2022 07:53), Max: 99.8 (15 Mar 2022 00:32)  HR: 57 (15 Mar 2022 11:19) (57 - 87)  BP: 101/57 (15 Mar 2022 11:19) (82/52 - 121/64)  BP(mean): --  RR: 17 (15 Mar 2022 11:19) (13 - 19)  SpO2: 98% (15 Mar 2022 11:19) (94% - 99%)    I&O's Summary    14 Mar 2022 07:01  -  15 Mar 2022 07:00  --------------------------------------------------------  IN: 3350 mL / OUT: 1178 mL / NET: 2172 mL    15 Mar 2022 07:01  -  15 Mar 2022 12:37  --------------------------------------------------------  IN: 0 mL / OUT: 618 mL / NET: -618 mL        Physical Exam  Gen: NAD, A&Ox3  Pulm: CTA bilat, no rales, rhonchi or wheezes, No respiratory distress, no subcostal retractions  CV: RRR, S1S2 normal, no JVD  Breast:  Wound: C/D/I, some ecchymosis or No hematoma noted, Bilateral flaps viable. some tenderness above right nipple.             R breast J{P 44cc.       L breast  RONY - 60cc             R Vioptix 54% signal 96,   L Vioptix  57%, signal 98  Abd: Soft, NT, ND, +BS,          Wound: C/D/I,  R abdominal RONY - 15cc,  L abdominal RONY:  28cc  : Morales out, pt voided 100cc                          11.6   10.79 )-----------( 257      ( 15 Mar 2022 06:16 )             34.7       03-15    139  |  109<H>  |  12  ----------------------------<  104<H>  3.9   |  24  |  0.81    Ca    8.4<L>      15 Mar 2022 06:16        A/P: 46y Female s/p BIlat simple mastectomy/ AMINA free flap  DVT prophylaxis/OOB  Incentive spirometry  Strict I&O's  Analgesia and antiemetics as needed  Adv. Diet  AM labs  
Ms. Colby is POD#1 s/p b/l mastectomy, sentinel node biopsy, AMINA flaps. She is doing well. She has already been out of bed and ambulated to the bathroom today. Pain is under control    Vital Signs Last 24 Hrs  T(C): 36.8 (15 Mar 2022 07:53), Max: 37.7 (15 Mar 2022 00:32)  T(F): 98.2 (15 Mar 2022 07:53), Max: 99.8 (15 Mar 2022 00:32)  HR: 66 (15 Mar 2022 07:53) (66 - 87)  BP: 101/50 (15 Mar 2022 07:53) (98/61 - 121/64)  BP(mean): --  RR: 16 (15 Mar 2022 07:53) (13 - 19)  SpO2: 97% (15 Mar 2022 07:53) (94% - 99%)    I&O's Detail    14 Mar 2022 07:01  -  15 Mar 2022 07:00  --------------------------------------------------------  IN:    Lactated Ringers: 150 mL    Other (mL): 3200 mL  Total IN: 3350 mL    OUT:    Bulb (mL): 85 mL    Bulb (mL): 44 mL    Bulb (mL): 90 mL    Bulb (mL): 39 mL    Indwelling Catheter - Urethral (mL): 500 mL    Oral Fluid: 0 mL    Other (mL): 320 mL    Voided (mL): 100 mL  Total OUT: 1178 mL    Total NET: 2172 mL      NAD  Chest - incisions intact, mastectomy flaps soft, AMINA flap with vioptix at 56, drain output serosanguinous  Abd - incision intact, drain output serosanguinous                          11.6   10.79 )-----------( 257      ( 15 Mar 2022 06:16 )             34.7   03-15    139  |  109<H>  |  12  ----------------------------<  104<H>  3.9   |  24  |  0.81    Ca    8.4<L>      15 Mar 2022 06:16    
Ms. Colby is POD#2 s/p b/l mastectomy, sentinel node biopsy, AMINA flaps. She is doing well. She has ambulated and is tolerating diet. She feels ready to go home.    Vital Signs Last 24 Hrs  T(C): 36.6 (16 Mar 2022 13:08), Max: 36.8 (15 Mar 2022 15:36)  T(F): 97.9 (16 Mar 2022 13:08), Max: 98.2 (15 Mar 2022 15:36)  HR: 75 (16 Mar 2022 13:08) (67 - 84)  BP: 127/72 (16 Mar 2022 13:08) (105/60 - 137/84)  BP(mean): --  RR: 18 (16 Mar 2022 13:08) (17 - 18)  SpO2: 97% (16 Mar 2022 13:08) (95% - 97%)    I&O's Detail    15 Mar 2022 07:01  -  16 Mar 2022 07:00  --------------------------------------------------------  IN:  Total IN: 0 mL    OUT:    Bulb (mL): 98 mL    Bulb (mL): 12 mL    Bulb (mL): 140 mL    Bulb (mL): 137 mL    Voided (mL): 1200 mL  Total OUT: 1587 mL    Total NET: -1587 mL      16 Mar 2022 07:01  -  16 Mar 2022 14:38  --------------------------------------------------------  IN:  Total IN: 0 mL    OUT:    Bulb (mL): 40 mL    Bulb (mL): 6 mL    Bulb (mL): 25 mL    Bulb (mL): 43 mL  Total OUT: 114 mL    Total NET: -114 mL      NAD  Chest - incisions intact, mastectomy flaps soft, patch of superficial epidermolysis in lower inner aspect, AMINA flap viable, drain output serosanguinous  Abd - incision intact, drain output serosanguinous
Patient is a 46y old  Female who presents with a chief complaint of Infiltrating ductal carcinoma of left breast    Post-op check: S/P Bilat simple mastectomy, Biopsy of both axilla, Intraoperative sentinel node mapping, and AMINA free flap    Pt is awake and alert without complaints.  Pain is well controlled    PAST MEDICAL & SURGICAL HISTORY:  HTN (hypertension)    Ductal carcinoma in situ (DCIS) of left breast    [place] teeth extracted    S/P biopsy  vulva    MEDICATIONS  (STANDING):  acetaminophen     Tablet .. 650 milliGRAM(s) Oral every 6 hours  acetaminophen   IVPB .. 1000 milliGRAM(s) IV Intermittent every 6 hours  heparin   Injectable 5000 Unit(s) SubCutaneous every 8 hours  influenza   Vaccine 0.5 milliLiter(s) IntraMuscular once  ketorolac   Injectable 15 milliGRAM(s) IV Push every 6 hours  lactated ringers. 1000 milliLiter(s) (75 mL/Hr) IV Continuous <Continuous>  lactated ringers. 1000 milliLiter(s) (75 mL/Hr) IV Continuous <Continuous>  senna 2 Tablet(s) Oral at bedtime      PHYSICAL EXAM:  T(C): 36.3 (03-14-22 @ 16:53), Max: 36.6 (03-14-22 @ 06:11)  HR: 87 (03-14-22 @ 21:00) (73 - 87)  BP: 104/58 (03-14-22 @ 21:00) (99/58 - 127/82)  RR: 15 (03-14-22 @ 21:00) (13 - 18)  SpO2: 97% (03-14-22 @ 21:00) (94% - 100%)  Wt(kg): --    Vioptix:  R: StO2=58% Signal Quality=89               L: StO2=55% Signal Quality=98  RONY ouput 5-9pm=  #1 Right breast=56cc                             #2 Right Abdomen=35cc                             #3 Left Abdomen=22cc                             #4 Left Breast=54cc  Urine output:  5-2zm=581hr    Gen:  Pt is awake and alert, in NAD    Skin:  warm and dry    Respiratory: Lungs clear and equal bilat, no r/r/w    Cardiovascular:  S1S2 reg, no M    Breasts:  symmetrical bilateral, flaps viable, incisions clean and dry, no ecchymosis or hematoma noted, soft to touch, RONY's bilateral with small amount of dark blood    Abd:  incisions clean and dry, no ecchymosis or hematoma noted, non-distended, +BS, soft, RONY's bilaterally with small amount of dark blood    Extremities:  venodynes bilaterally, no c/c/e    Vascular:  2+/4+ bilateral    Neurological:  A & O X3, CNII-XII grossly intact

## 2022-03-16 NOTE — DISCHARGE NOTE NURSING/CASE MANAGEMENT/SOCIAL WORK - PATIENT PORTAL LINK FT
You can access the FollowMyHealth Patient Portal offered by [facility] by registering at the following website: [email or web address]. By joining [employer]’s FollowMyHealth portal, you will also be able to view your health information using other applications (apps) compatible with our system.

## 2022-03-16 NOTE — DISCHARGE NOTE PROVIDER - NSDCFUSCHEDAPPT_GEN_ALL_CORE_FT
NIKKO KOVACS ; 03/22/2022 ; NPP Med Breast [address]  NIKKO KOVACS ; 03/24/2022 ; NPP Plast Surg [address] [facility]

## 2022-03-16 NOTE — PROGRESS NOTE ADULT - NSPROGADDITIONALINFOA_GEN_ALL_CORE
Patient seen on rounds this morning 7:30 am.  Patient doing well.  Pain controlled.  Flap exams normal.  Abdomen normal.  Drainage serosanguinous.  No sign of seroma/hematoma.    Small amount of right inferior pole mastectomy skin flap ecchymosis.    Plan for discharge today.

## 2022-03-16 NOTE — DISCHARGE NOTE NURSING/CASE MANAGEMENT/SOCIAL WORK - NSDCPEFALRISK_GEN_ALL_CORE
For information on Fall & Injury Prevention, visit: [email or web address] OR  [email or web address] OR  https://www.cdc.gov/steadi/patient.html

## 2022-03-16 NOTE — DISCHARGE NOTE PROVIDER - CARE PROVIDER_API CALL
Aren Ramirez)  Plastic Surgery; Surgery; Surgery of the Hand  [address] [facility], [address]  [place] [address]  Phone: [phone]  Fax: [phone]  Scheduled Appointment: 03/24/2022

## 2022-03-16 NOTE — PROGRESS NOTE ADULT - ASSESSMENT
- multimodal pain control  - ambulate with assistance  - DVT and GI prophylaxis
Ass:  S/P Bilat simple mastectomy, Biopsy of both axilla, Intraoperative sentinel node mapping, and AMINA free flap  Plan:  Continue to Monitor in PACU, continue present management
- multimodal pain control  - drain care teaching  - d/c home   - followup in office as previously scheduled

## 2022-03-17 ENCOUNTER — NON-APPOINTMENT (OUTPATIENT)
Age: 47
End: 2022-03-17

## 2022-03-22 ENCOUNTER — APPOINTMENT (OUTPATIENT)
Dept: BREAST CENTER | Facility: CLINIC | Age: 47
End: 2022-03-22
Payer: COMMERCIAL

## 2022-03-22 VITALS
HEIGHT: 68 IN | WEIGHT: 199 LBS | BODY MASS INDEX: 30.16 KG/M2 | SYSTOLIC BLOOD PRESSURE: 140 MMHG | DIASTOLIC BLOOD PRESSURE: 93 MMHG | HEART RATE: 88 BPM

## 2022-03-22 PROCEDURE — 99024 POSTOP FOLLOW-UP VISIT: CPT

## 2022-03-22 RX ORDER — OXYCODONE 5 MG/1
5 TABLET ORAL
Qty: 10 | Refills: 0 | Status: DISCONTINUED | COMMUNITY
Start: 2022-03-10 | End: 2022-03-22

## 2022-03-23 NOTE — PHYSICAL EXAM
[Normocephalic] : normocephalic [Sclera nonicteric] : sclera nonicteric [Examined in the supine and seated position] : examined in the supine and seated position [No dominant masses] : no dominant masses in right breast  [Soft] : abdomen soft [No Edema] : no edema [No dominant masses] : no dominant masses left breast [de-identified] : Circumareolar incision with vertical extension is intact. AMINA flap viable. Superficial epidermolysis in lower inner aspect with mild surrounding erythema and drainage. Drain output serosanguinous [de-identified] : Circumareolar incision with vertical extension is intact. AMINA flap viable. Blue suture marking the prior area of the cancer is removed. Drain output serosanguinous [de-identified] : Transverse incision intact. Drain output serosanguinous. R abdominal drain with <20 ml/day output is removed.

## 2022-03-23 NOTE — CONSULT LETTER
[Dear  ___] : Dear  [unfilled], [Courtesy Letter:] : I had the pleasure of seeing your patient, [unfilled], in my office today. [Please see my note below.] : Please see my note below. [Consult Closing:] : Thank you very much for allowing me to participate in the care of this patient.  If you have any questions, please do not hesitate to contact me. [Sincerely,] : Sincerely, [FreeTextEntry3] : Lissa Mederos MD FACS

## 2022-03-23 NOTE — DATA REVIEWED
[FreeTextEntry1] : I have independently reviewed the reports and the images. \par \par B/l mammogram and US 1/11/22\par - heterogenously dense\par - 2 cm mass at area of concern in L UOQ; correlates to a 1.8 x 1.3 x 2.1 cm nodule at 1:00 N10; US bx\par - benign R intramammary node\par - R breast cyst\par - no findings in L axilla\par - BIRADS 4\par \par US needle bx 1/13/22\par - L breast 1:00, q clip = infiltrating ductal carcinoma , 6/9, ER 90%, NV 73%, Her2 0\par \par Breast MRI 2/1/22\par - 3 cm nonmass enhancement in R breast 9:00 N9; MR bx\par - 0.6 cm enhancing mass in R breast 7:00 N6; MR bx\par - 2 x 2 x 3 cm enhancement in L breast 1:00 N9 at biopsied cancer\par - 0.3 cm enhancement in L breast skin at 3:00 N9; clinical exam\par - questionable L axillary nodes; L axillary  US\par \par L limited US 2/17/22\par - normal L axillary nodes\par \par Surgical pathology 3/14/22\par - R mastectomy is negative; 0/2 R sln\par - L mastectomy = 2.1 cm tumor; 0/2 L sln\par \par Oncotype test (sent from biopsy, report 2/11/22)\par - Oncotype score = 51

## 2022-03-23 NOTE — HISTORY OF PRESENT ILLNESS
[FreeTextEntry1] : Ms. Colby is a 46 year old woman here for a postop visit s/p b/l mastectomy, sentinel node biopsy, AMINA reconstruction. Postoperatively she is using only Tylenol and ibuprofen for pain control. She is walking around. There is an area by the right mastectomy incision that has blistered. No fevers. \par \par Her genetic panel testing is negative. Her family history is not significant for any breast cancer.

## 2022-03-24 ENCOUNTER — APPOINTMENT (OUTPATIENT)
Dept: PLASTIC SURGERY | Facility: CLINIC | Age: 47
End: 2022-03-24
Payer: COMMERCIAL

## 2022-03-24 VITALS — SYSTOLIC BLOOD PRESSURE: 123 MMHG | DIASTOLIC BLOOD PRESSURE: 76 MMHG | HEART RATE: 104 BPM | OXYGEN SATURATION: 97 %

## 2022-03-24 PROCEDURE — 99024 POSTOP FOLLOW-UP VISIT: CPT

## 2022-03-24 NOTE — PHYSICAL EXAM
[NI] : Normal [de-identified] : b/l breast flaps soft and symmetrical\par skin paddles viable \par incisions c/d/i\par right breast with vertical incision surrounding superficial epidermolysis and blistering, mild erythema surrounding \par b/l lower pole breast edema\par drains in place and functioning\par no palpable fluid collections [de-identified] : abdomen soft and non tender\par umbilicus viable\par incisions c/d/i\par drain in place and functioning

## 2022-03-24 NOTE — ASSESSMENT
[FreeTextEntry1] : 47 yo female s/p b/l skin sparing mastectomy with reconstruction using AMINA flap and alloderm 3/14/22\par pt doing well\par pt seen and examined with Dr. Ramirez\par discussed leaving the drains in for 1 more week\par monitor for redness, swelling, fever, chills\par no heavy lifting or strenuous activity\par continue to apply bacitracin daily to right breast and take antibiotics as prescribed by Dr. Mederos until completed\par all pt questions answered\par

## 2022-03-30 ENCOUNTER — APPOINTMENT (OUTPATIENT)
Dept: PLASTIC SURGERY | Facility: CLINIC | Age: 47
End: 2022-03-30
Payer: COMMERCIAL

## 2022-03-30 VITALS
DIASTOLIC BLOOD PRESSURE: 86 MMHG | OXYGEN SATURATION: 98 % | BODY MASS INDEX: 30.16 KG/M2 | TEMPERATURE: 97.3 F | SYSTOLIC BLOOD PRESSURE: 133 MMHG | HEIGHT: 68 IN | WEIGHT: 199 LBS | HEART RATE: 78 BPM | RESPIRATION RATE: 16 BRPM

## 2022-03-30 PROCEDURE — 99024 POSTOP FOLLOW-UP VISIT: CPT

## 2022-03-30 NOTE — HISTORY OF PRESENT ILLNESS
[FreeTextEntry1] : Ms. NIKKO KOVACS  is a 46 year  old female  with no pertinent past medical history who presents with newly diagnosed left breast cancer which was found on self exam in November.  Pt had mammogram 1/11/22, and later confirmed IDC with ultrasound guided core biopsy 1/13/22  pt was referred to the office by Dr Mederos  \par \par pt is now s/p b/l skin sparing mastectomy with reconstruction using AMINA flap and alloderm 3/14/22\par pt doing well\par drains < 15 cc \par Denies fever, chills, LE pain/edema\par pt now noticed a fungal rash to her right breast\par

## 2022-03-30 NOTE — ASSESSMENT
[FreeTextEntry1] : 45 yo female s/p reconstruction with AMINA flap 3/14/22\par pt doing well\par discussed to d/c bacitracin, continue antibiotics\par all remaining drains removed without difficulty\par wash area daily in shower and apply gauze daily\par monitor for redness, swelling, fever, chills\par no heavy lifting or strenuous activity\par fungal cream to be sent for rash under breast\par all pt questions answered\par

## 2022-03-30 NOTE — PHYSICAL EXAM
[NI] : Normal [de-identified] : b/l breast flaps soft and symmetrical\par skin paddles viable \par incisions c/d/i\par right breast with vertical incision surrounding superficial epidermolysis and blistering, fibrin at base \par b/l lower pole breast edema\par drains in place and functioning\par no palpable fluid collections [de-identified] : abdomen soft and non tender\par umbilicus viable\par incisions c/d/i\par drain in place and functioning

## 2022-04-04 ENCOUNTER — APPOINTMENT (OUTPATIENT)
Dept: BREAST CENTER | Facility: CLINIC | Age: 47
End: 2022-04-04
Payer: COMMERCIAL

## 2022-04-04 VITALS
SYSTOLIC BLOOD PRESSURE: 133 MMHG | BODY MASS INDEX: 28.79 KG/M2 | HEART RATE: 77 BPM | WEIGHT: 190 LBS | DIASTOLIC BLOOD PRESSURE: 87 MMHG | HEIGHT: 68 IN

## 2022-04-04 PROCEDURE — 99024 POSTOP FOLLOW-UP VISIT: CPT

## 2022-04-04 RX ORDER — CEFADROXIL 500 MG/1
500 CAPSULE ORAL
Qty: 10 | Refills: 0 | Status: DISCONTINUED | COMMUNITY
Start: 2022-03-22 | End: 2022-04-04

## 2022-04-04 NOTE — DATA REVIEWED
[FreeTextEntry1] : I have independently reviewed the reports and the images. \par \par B/l mammogram and US 1/11/22\par - heterogenously dense\par - 2 cm mass at area of concern in L UOQ; correlates to a 1.8 x 1.3 x 2.1 cm nodule at 1:00 N10; US bx\par - benign R intramammary node\par - R breast cyst\par - no findings in L axilla\par - BIRADS 4\par \par US needle bx 1/13/22\par - L breast 1:00, q clip = infiltrating ductal carcinoma , 6/9, ER 90%, ID 73%, Her2 0\par \par Breast MRI 2/1/22\par - 3 cm nonmass enhancement in R breast 9:00 N9; MR bx\par - 0.6 cm enhancing mass in R breast 7:00 N6; MR bx\par - 2 x 2 x 3 cm enhancement in L breast 1:00 N9 at biopsied cancer\par - 0.3 cm enhancement in L breast skin at 3:00 N9; clinical exam\par - questionable L axillary nodes; L axillary  US\par \par L limited US 2/17/22\par - normal L axillary nodes\par \par Surgical pathology 3/14/22\par - R mastectomy is negative; 0/2 R sln\par - L mastectomy = 2.1 cm tumor; 0/2 L sln\par \par Oncotype test (sent from biopsy, report 2/11/22)\par - Oncotype score = 51

## 2022-04-04 NOTE — HISTORY OF PRESENT ILLNESS
[FreeTextEntry1] : Ms. Colby is a 46 year old woman here for a postop visit s/p b/l mastectomy, sentinel node biopsy, AMINA reconstruction. Her drains have been removed. Fungal cream has been started for a rash under the right breast. She had a rash across her upper chest that is now improved and may be a delayed allergic reaction.  \par \par Her genetic panel testing is negative. Her family history is not significant for any breast cancer.

## 2022-04-04 NOTE — PHYSICAL EXAM
[Normocephalic] : normocephalic [Sclera nonicteric] : sclera nonicteric [Examined in the supine and seated position] : examined in the supine and seated position [No dominant masses] : no dominant masses in right breast  [No dominant masses] : no dominant masses left breast [Soft] : abdomen soft [No Edema] : no edema [de-identified] : Circumareolar incision with vertical extension. AMINA flap. Superficial epidermolysis in lower inner aspect of mastectomy flap. Inframammary rash.   [de-identified] : Circumareolar incision with vertical extension. AMINA flap  [de-identified] : Transverse incision

## 2022-04-05 ENCOUNTER — APPOINTMENT (OUTPATIENT)
Dept: PLASTIC SURGERY | Facility: CLINIC | Age: 47
End: 2022-04-05
Payer: COMMERCIAL

## 2022-04-05 VITALS
DIASTOLIC BLOOD PRESSURE: 88 MMHG | TEMPERATURE: 97.2 F | HEIGHT: 68 IN | OXYGEN SATURATION: 97 % | SYSTOLIC BLOOD PRESSURE: 133 MMHG | HEART RATE: 73 BPM | BODY MASS INDEX: 28.49 KG/M2 | RESPIRATION RATE: 18 BRPM | WEIGHT: 188 LBS

## 2022-04-05 PROCEDURE — 99024 POSTOP FOLLOW-UP VISIT: CPT

## 2022-04-05 NOTE — ASSESSMENT
[FreeTextEntry1] : 45 yo female s/p AMINA flap reconstruction 3/14/22\par pt doing well\par discussed now that the area on the right breast has dried out she can apply moist to dry gauze daily after showers\par monitor for redness, swelling, fever, chills\par no heavy lifting or strenuous activity\par all pt questions answered\par

## 2022-04-05 NOTE — HISTORY OF PRESENT ILLNESS
[FreeTextEntry1] : Ms. NIKKO KOVACS  is a 46 year  old female  with no pertinent past medical history who presents with newly diagnosed left breast cancer which was found on self exam in November.  Pt had mammogram 1/11/22, and later confirmed IDC with ultrasound guided core biopsy 1/13/22  pt was referred to the office by Dr Mederos  \par \par pt is now s/p b/l skin sparing mastectomy with reconstruction using AMINA flap and alloderm 3/14/22\par pt doing well\par Denies fever, chills, LE pain/edema\par pt has been applying dry gauze to the area as instructed with improvement in her right skin flap necrosis\par \par

## 2022-04-05 NOTE — PHYSICAL EXAM
[NI] : Normal [de-identified] : b/l breast flaps soft and symmetrical\par skin paddles viable \par incisions c/d/i\par right breast with scattered areas of healed epidermis, there is an area of superficial eschar, no signs of infection\par no palpable fluid collections [de-identified] : abdomen soft and non tender\par umbilicus viable\par incisions c/d/i

## 2022-04-12 ENCOUNTER — APPOINTMENT (OUTPATIENT)
Dept: PLASTIC SURGERY | Facility: CLINIC | Age: 47
End: 2022-04-12
Payer: COMMERCIAL

## 2022-04-12 VITALS
TEMPERATURE: 97.3 F | HEIGHT: 68 IN | BODY MASS INDEX: 28.79 KG/M2 | RESPIRATION RATE: 16 BRPM | SYSTOLIC BLOOD PRESSURE: 124 MMHG | OXYGEN SATURATION: 100 % | DIASTOLIC BLOOD PRESSURE: 90 MMHG | WEIGHT: 190 LBS | HEART RATE: 76 BPM

## 2022-04-12 PROCEDURE — 99024 POSTOP FOLLOW-UP VISIT: CPT

## 2022-04-12 NOTE — ASSESSMENT
[FreeTextEntry1] : 47 yo female s/p b/l breast reconstruction with AMINA flap 3/14/22\par pt to continue moist to dry dressings daily after showers\par monitor for redness, swelling, fever, chills\par no heavy lifting or strenuous activity\par all pt questions answered\par

## 2022-04-12 NOTE — HISTORY OF PRESENT ILLNESS
[FreeTextEntry1] : Ms. NIKKO KOVACS  is a 46 year  old female  with no pertinent past medical history who presents with newly diagnosed left breast cancer which was found on self exam in November.  Pt had mammogram 1/11/22, and later confirmed IDC with ultrasound guided core biopsy 1/13/22  pt was referred to the office by Dr Mederos  \par \par pt is now s/p b/l skin sparing mastectomy with reconstruction using AMINA flap and alloderm 3/14/22\par pt doing well\par Denies fever, chills, LE pain/edema\par pt has been applying moist to dry gauze to the area as instructed with improvement in her right skin flap necrosis\par \par

## 2022-04-12 NOTE — PHYSICAL EXAM
[NI] : Normal [de-identified] : b/l breast flaps soft and symmetrical\par skin paddles viable \par incisions c/d/i\par right breast with scattered areas of healed epidermis, there is an area of superficial eschar which is stable but softening, no signs of infection\par no palpable fluid collections [de-identified] : abdomen soft and non tender\par umbilicus viable\par incisions c/d/i

## 2022-04-19 ENCOUNTER — APPOINTMENT (OUTPATIENT)
Dept: PLASTIC SURGERY | Facility: CLINIC | Age: 47
End: 2022-04-19
Payer: COMMERCIAL

## 2022-04-19 VITALS
WEIGHT: 193 LBS | BODY MASS INDEX: 29.25 KG/M2 | SYSTOLIC BLOOD PRESSURE: 143 MMHG | OXYGEN SATURATION: 98 % | HEART RATE: 72 BPM | DIASTOLIC BLOOD PRESSURE: 94 MMHG | HEIGHT: 68 IN | RESPIRATION RATE: 16 BRPM | TEMPERATURE: 97.3 F

## 2022-04-19 PROCEDURE — 99024 POSTOP FOLLOW-UP VISIT: CPT

## 2022-04-19 NOTE — HISTORY OF PRESENT ILLNESS
[FreeTextEntry1] : Ms. NIKKO KOVACS  is a 46 year  old female  with no pertinent past medical history who presents with newly diagnosed left breast cancer which was found on self exam in November.  Pt had mammogram 1/11/22, and later confirmed IDC with ultrasound guided core biopsy 1/13/22  pt was referred to the office by Dr Mederos  \par \par pt is now s/p b/l skin sparing mastectomy with reconstruction using AMINA flap and alloderm 3/14/22\par pt doing well\par Denies fever, chills, LE pain/edema\par pt has been applying moist to dry gauze to the area as instructed with improvement in her right skin flap necrosis, with improvement \par \par

## 2022-04-19 NOTE — ASSESSMENT
[FreeTextEntry1] : 47 yo female s/p b/l breast reconstruction with AMINA flap 3/14/22\par pt doing well\par continue daily dressing changes with moist to dry dressings\par monitor for redness, swelling, fever, chills\par no heavy lifting or strenuous activity\par all pt questions answered\par

## 2022-04-19 NOTE — PHYSICAL EXAM
[NI] : Normal [de-identified] : b/l breast flaps soft and symmetrical\par skin paddles viable \par incisions c/d/i\par right breast with scattered areas of healed epidermis, there is an area of superficial eschar which is stable but softening, no signs of infection, beefy red granulation tissue at base \par no palpable fluid collections [de-identified] : abdomen soft and non tender\par umbilicus viable\par incisions c/d/i

## 2022-04-25 ENCOUNTER — RESULT REVIEW (OUTPATIENT)
Age: 47
End: 2022-04-25

## 2022-04-26 ENCOUNTER — APPOINTMENT (OUTPATIENT)
Dept: PLASTIC SURGERY | Facility: CLINIC | Age: 47
End: 2022-04-26
Payer: COMMERCIAL

## 2022-04-26 ENCOUNTER — OUTPATIENT (OUTPATIENT)
Dept: OUTPATIENT SERVICES | Facility: HOSPITAL | Age: 47
LOS: 1 days | Discharge: HOME | End: 2022-04-26
Payer: COMMERCIAL

## 2022-04-26 VITALS
HEIGHT: 68 IN | DIASTOLIC BLOOD PRESSURE: 79 MMHG | WEIGHT: 190 LBS | SYSTOLIC BLOOD PRESSURE: 158 MMHG | TEMPERATURE: 97.3 F | OXYGEN SATURATION: 100 % | BODY MASS INDEX: 28.79 KG/M2 | HEART RATE: 79 BPM | RESPIRATION RATE: 16 BRPM

## 2022-04-26 DIAGNOSIS — C50.919 MALIGNANT NEOPLASM OF UNSPECIFIED SITE OF UNSPECIFIED FEMALE BREAST: ICD-10-CM

## 2022-04-26 DIAGNOSIS — K08.409 PARTIAL LOSS OF TEETH, UNSPECIFIED CAUSE, UNSPECIFIED CLASS: Chronic | ICD-10-CM

## 2022-04-26 DIAGNOSIS — Z98.890 OTHER SPECIFIED POSTPROCEDURAL STATES: Chronic | ICD-10-CM

## 2022-04-26 PROCEDURE — 99024 POSTOP FOLLOW-UP VISIT: CPT

## 2022-04-26 NOTE — ASSESSMENT
[FreeTextEntry1] : 45 yo female s/p breast reconstruction with AMINA flap 3/14/22\par pt doing well\par discussed changing dressing changes to medi honey on the wound daily after showers and apply gauze \par monitor for redness, swelling, fever, chills\par no heavy lifting or strenuous activity\par continue to wear soft, non wire bra\par all pt questions answered\par

## 2022-04-26 NOTE — PHYSICAL EXAM
[NI] : Normal [de-identified] : b/l breast flaps soft and symmetrical\par skin paddles viable \par incisions c/d/i\par right breast with scattered areas of healed epidermis, there is an area of superficial eschar which is stable but softening, no signs of infection, beefy red granulation tissue at base \par no palpable fluid collections [de-identified] : abdomen soft and non tender\par umbilicus viable\par incisions c/d/i

## 2022-04-27 ENCOUNTER — RESULT REVIEW (OUTPATIENT)
Age: 47
End: 2022-04-27

## 2022-04-27 LAB — SURGICAL PATHOLOGY STUDY: SIGNIFICANT CHANGE UP

## 2022-04-27 PROCEDURE — 88321 CONSLTJ&REPRT SLD PREP ELSWR: CPT

## 2022-05-03 ENCOUNTER — APPOINTMENT (OUTPATIENT)
Dept: PLASTIC SURGERY | Facility: CLINIC | Age: 47
End: 2022-05-03
Payer: COMMERCIAL

## 2022-05-03 VITALS
BODY MASS INDEX: 29.1 KG/M2 | OXYGEN SATURATION: 98 % | WEIGHT: 192 LBS | SYSTOLIC BLOOD PRESSURE: 131 MMHG | HEIGHT: 68 IN | TEMPERATURE: 97.3 F | HEART RATE: 79 BPM | DIASTOLIC BLOOD PRESSURE: 86 MMHG | RESPIRATION RATE: 16 BRPM

## 2022-05-03 PROCEDURE — 99024 POSTOP FOLLOW-UP VISIT: CPT

## 2022-05-03 RX ORDER — AMLODIPINE BESYLATE 5 MG/1
5 TABLET ORAL
Refills: 0 | Status: ACTIVE | COMMUNITY
Start: 2022-03-01

## 2022-05-03 NOTE — PHYSICAL EXAM
[NI] : Normal [de-identified] : b/l breast flaps soft and symmetrical\par skin paddles viable \par incisions c/d/i\par right breast wound measuring 5 cm x 2 cm , beefy granulation tissue at base, no signs of infection \par no palpable fluid collections [de-identified] : abdomen soft and non tender\par umbilicus viable\par incisions c/d/i

## 2022-05-03 NOTE — HISTORY OF PRESENT ILLNESS
[FreeTextEntry1] : Ms. NIKKO KOVACS  is a 46 year  old female  with no pertinent past medical history who presents with newly diagnosed left breast cancer which was found on self exam in November.  Pt had mammogram 1/11/22, and later confirmed IDC with ultrasound guided core biopsy 1/13/22  pt was referred to the office by Dr Mederos  \par \par pt is now s/p b/l skin sparing mastectomy with reconstruction using AMINA flap and alloderm 3/14/22\par pt doing well\par Denies fever, chills, LE pain/edema\par pt has been applying medi honey after showers with some improvement\par \par

## 2022-05-03 NOTE — ASSESSMENT
[FreeTextEntry1] : 48 yo female s/p breast reconstruction with AMINA flaps 3/14/22\par pt doing well\par continue medi honey daily\par monitor for redness, swelling, fever, chills\par no heavy lifting or strenuous activity\par all pt questions answered\par

## 2022-05-05 ENCOUNTER — LABORATORY RESULT (OUTPATIENT)
Age: 47
End: 2022-05-05

## 2022-05-05 ENCOUNTER — RESULT REVIEW (OUTPATIENT)
Age: 47
End: 2022-05-05

## 2022-05-05 ENCOUNTER — APPOINTMENT (OUTPATIENT)
Dept: HEMATOLOGY ONCOLOGY | Facility: CLINIC | Age: 47
End: 2022-05-05
Payer: COMMERCIAL

## 2022-05-05 ENCOUNTER — NON-APPOINTMENT (OUTPATIENT)
Age: 47
End: 2022-05-05

## 2022-05-05 VITALS
HEIGHT: 68 IN | DIASTOLIC BLOOD PRESSURE: 85 MMHG | WEIGHT: 191.8 LBS | RESPIRATION RATE: 17 BRPM | OXYGEN SATURATION: 98 % | BODY MASS INDEX: 29.07 KG/M2 | HEART RATE: 97 BPM | TEMPERATURE: 97.3 F | SYSTOLIC BLOOD PRESSURE: 129 MMHG

## 2022-05-05 DIAGNOSIS — I10 ESSENTIAL (PRIMARY) HYPERTENSION: ICD-10-CM

## 2022-05-05 LAB
BASOPHILS # BLD AUTO: 0.05 K/UL — SIGNIFICANT CHANGE UP (ref 0–0.2)
BASOPHILS NFR BLD AUTO: 0.6 % — SIGNIFICANT CHANGE UP (ref 0–2)
EOSINOPHIL # BLD AUTO: 0.18 K/UL — SIGNIFICANT CHANGE UP (ref 0–0.5)
EOSINOPHIL NFR BLD AUTO: 2.2 % — SIGNIFICANT CHANGE UP (ref 0–6)
HCT VFR BLD CALC: 42.6 % — SIGNIFICANT CHANGE UP (ref 34.5–45)
HGB BLD-MCNC: 14.4 G/DL — SIGNIFICANT CHANGE UP (ref 11.5–15.5)
IMM GRANULOCYTES NFR BLD AUTO: 0.2 % — SIGNIFICANT CHANGE UP (ref 0–1.5)
LYMPHOCYTES # BLD AUTO: 2.12 K/UL — SIGNIFICANT CHANGE UP (ref 1–3.3)
LYMPHOCYTES # BLD AUTO: 26.1 % — SIGNIFICANT CHANGE UP (ref 13–44)
MCHC RBC-ENTMCNC: 31 PG — SIGNIFICANT CHANGE UP (ref 27–34)
MCHC RBC-ENTMCNC: 33.8 G/DL — SIGNIFICANT CHANGE UP (ref 32–36)
MCV RBC AUTO: 91.8 FL — SIGNIFICANT CHANGE UP (ref 80–100)
MONOCYTES # BLD AUTO: 0.39 K/UL — SIGNIFICANT CHANGE UP (ref 0–0.9)
MONOCYTES NFR BLD AUTO: 4.8 % — SIGNIFICANT CHANGE UP (ref 2–14)
NEUTROPHILS # BLD AUTO: 5.37 K/UL — SIGNIFICANT CHANGE UP (ref 1.8–7.4)
NEUTROPHILS NFR BLD AUTO: 66.1 % — SIGNIFICANT CHANGE UP (ref 43–77)
NRBC # BLD: 0 /100 WBCS — SIGNIFICANT CHANGE UP (ref 0–0)
PLATELET # BLD AUTO: 373 K/UL — SIGNIFICANT CHANGE UP (ref 150–400)
RBC # BLD: 4.64 M/UL — SIGNIFICANT CHANGE UP (ref 3.8–5.2)
RBC # FLD: 11.7 % — SIGNIFICANT CHANGE UP (ref 10.3–14.5)
WBC # BLD: 8.13 K/UL — SIGNIFICANT CHANGE UP (ref 3.8–10.5)
WBC # FLD AUTO: 8.13 K/UL — SIGNIFICANT CHANGE UP (ref 3.8–10.5)

## 2022-05-05 PROCEDURE — 99205 OFFICE O/P NEW HI 60 MIN: CPT

## 2022-05-06 LAB
25(OH)D3 SERPL-MCNC: 26 NG/ML
ALBUMIN SERPL ELPH-MCNC: 4.9 G/DL
ALP BLD-CCNC: 102 U/L
ALT SERPL-CCNC: 11 U/L
ANION GAP SERPL CALC-SCNC: 15 MMOL/L
APTT BLD: 31.6 SEC
AST SERPL-CCNC: 15 U/L
BILIRUB SERPL-MCNC: 0.2 MG/DL
BUN SERPL-MCNC: 10 MG/DL
CALCIUM SERPL-MCNC: 9.8 MG/DL
CHLORIDE SERPL-SCNC: 102 MMOL/L
CO2 SERPL-SCNC: 22 MMOL/L
CREAT SERPL-MCNC: 0.77 MG/DL
EGFR: 97 ML/MIN/1.73M2
GLUCOSE SERPL-MCNC: 119 MG/DL
HBV CORE IGG+IGM SER QL: NONREACTIVE
HBV SURFACE AB SER QL: REACTIVE
HBV SURFACE AG SER QL: NONREACTIVE
HCV AB SER QL: NONREACTIVE
HCV S/CO RATIO: 0.14 S/CO
INR PPP: 0.97 RATIO
POTASSIUM SERPL-SCNC: 4.1 MMOL/L
PROT SERPL-MCNC: 7.6 G/DL
PT BLD: 11.4 SEC
SODIUM SERPL-SCNC: 139 MMOL/L

## 2022-05-10 ENCOUNTER — APPOINTMENT (OUTPATIENT)
Dept: PLASTIC SURGERY | Facility: CLINIC | Age: 47
End: 2022-05-10
Payer: COMMERCIAL

## 2022-05-10 ENCOUNTER — OUTPATIENT (OUTPATIENT)
Dept: OUTPATIENT SERVICES | Facility: HOSPITAL | Age: 47
LOS: 1 days | End: 2022-05-10
Payer: COMMERCIAL

## 2022-05-10 VITALS
DIASTOLIC BLOOD PRESSURE: 91 MMHG | HEART RATE: 77 BPM | BODY MASS INDEX: 28.95 KG/M2 | TEMPERATURE: 97.7 F | HEIGHT: 68 IN | WEIGHT: 191 LBS | SYSTOLIC BLOOD PRESSURE: 133 MMHG | OXYGEN SATURATION: 97 %

## 2022-05-10 DIAGNOSIS — Z11.52 ENCOUNTER FOR SCREENING FOR COVID-19: ICD-10-CM

## 2022-05-10 DIAGNOSIS — Z98.890 OTHER SPECIFIED POSTPROCEDURAL STATES: Chronic | ICD-10-CM

## 2022-05-10 DIAGNOSIS — K08.409 PARTIAL LOSS OF TEETH, UNSPECIFIED CAUSE, UNSPECIFIED CLASS: Chronic | ICD-10-CM

## 2022-05-10 LAB — SARS-COV-2 RNA SPEC QL NAA+PROBE: SIGNIFICANT CHANGE UP

## 2022-05-10 PROCEDURE — 99024 POSTOP FOLLOW-UP VISIT: CPT

## 2022-05-10 PROCEDURE — C9803: CPT

## 2022-05-10 PROCEDURE — U0005: CPT

## 2022-05-10 PROCEDURE — U0003: CPT

## 2022-05-10 NOTE — HISTORY OF PRESENT ILLNESS
[FreeTextEntry1] : Ms. NIKKO KOVACS  is a 46 year  old female  with no pertinent past medical history who presents with newly diagnosed left breast cancer which was found on self exam in November.  Pt had mammogram 1/11/22, and later confirmed IDC with ultrasound guided core biopsy 1/13/22  pt was referred to the office by Dr Mederos  \par \par pt is now s/p b/l skin sparing mastectomy with reconstruction using AMINA flap and alloderm 3/14/22\par pt doing well\par Denies fever, chills, LE pain/edema\par pt has been applying medi honey after showers with some improvement\par \par she is scheduled to begin chemo 5/18

## 2022-05-10 NOTE — ASSESSMENT
[FreeTextEntry1] : 48 yo female s/p AMINA flap 3/14/22\par pt doing well\par silver nitrate applied to wound today \par continue daily dressing changes\par monitor for redness, swelling, fever, chills\par no heavy lifting or strenuous activity\par continue to wear soft, non wire bra\par all pt questions answered\par

## 2022-05-10 NOTE — PHYSICAL EXAM
[NI] : Normal [de-identified] : b/l breast flaps soft and symmetrical\par skin paddles viable \par incisions c/d/i\par right breast wound measuring 3 cm x 2 cm , beefy granulation tissue at base, no signs of infection \par no palpable fluid collections [de-identified] : abdomen soft and non tender\par umbilicus viable\par incisions c/d/i

## 2022-05-11 ENCOUNTER — APPOINTMENT (OUTPATIENT)
Dept: CARDIOLOGY | Facility: CLINIC | Age: 47
End: 2022-05-11
Payer: COMMERCIAL

## 2022-05-11 PROCEDURE — 93306 TTE W/DOPPLER COMPLETE: CPT

## 2022-05-11 NOTE — PHYSICAL EXAM
[Fully active, able to carry on all pre-disease performance without restriction] : Status 0 - Fully active, able to carry on all pre-disease performance without restriction [Normal] : affect appropriate [de-identified] : s/p Bilateral mastectomies with reconstruction and well healed incisions

## 2022-05-11 NOTE — HISTORY OF PRESENT ILLNESS
[Disease: _____________________] : Disease: [unfilled] [T: ___] : T[unfilled] [N: ___] : N[unfilled] [M: ___] : M[unfilled] [AJCC Stage: ____] : AJCC Stage: [unfilled] [de-identified] : Left breast cancer diagnosed at the age of 46 \par 1/22 The patient noted a mass in her left breast\par 01/11/22 Mammogram and US revealed an indeterminate 2.1 cm mass in the left breast 1 o'clock axis 10 cm FN. TREVON in right breast.\par 01/13/22 Left breast 1 o'clock axis US guided core biopsy: a 0.8 cm IDC, SBR 6/9, ER >90%, NJ 73%, HER-2 negative, Ki-67 90% and P53 5%\par 01/25/22 [facility] Cancer Next panel of 36 gene negative for pathogenic variants\par 02/01/22 MRI of the breast revealed focal non mass enhancement in the right breast 9 o'clock axis 9 cm FN for which MRI guided biopsy was recommended. Enhancing mass in the right breast 7 o'clock axis 6 cm FN, for which MRI guided core biopsy  was recommended. Biopsy proven malignancy in the left breast 1 o'clock axis for which surgical and oncologic management is recommended. Questionably prominent left axillary LN, for which targeted US is recommended. \par 02/11/22 Oncotype DX breast recurrence score = 51 with predicted risk of distant recurrence at 9 years of >39% with Tamoxifen alone. Absolute chemotherapy benefit of >15%\par 02/17/22 Left axillary US showed normal left axillary lymph nodes.\par 03/14/22 Bilateral mastectomies by Dr. Lissa Mederos and AMINA flap reconstruction with Dr. Ramirez and Dr. Nas Millan\par Left mastectomy: 2.1 x 1.8 x 1.5 cm IDC, SBR 9/9 with 0/6 SLN involved and LVI identified\par Right mastectomy: Fibrocystic mastopathy, proliferative, with florid ductal hyperplasia and fibroadenomatoid change. Focal PASH with 0/2 LN\par  [de-identified] : 2.1 cm IDC, SBR 9/9, +LVI, 0/6 SLN, ER >90%, AK 73%, HER-2 negative, Ki-67 90%, P53 5%, Oncotype = 51 (RR 39%) [de-identified] : Zarina comes to discuss the medical management of her breast cancer. \par She is  with two children ages 6 and 10. She is a HS  and currently on medical leave.\par \par HEALTH MAINTENANCE:\par PCP: Krys Ayala MD\par GYN: Pallavi Angel MD\par Mammogram: s/p bilateral mastectomies\par Pap smear:  negative\par Colonoscopy: none \par Genetic testin22 [employer] Cancer Next gene panel with 36 genes negative for pathogenic variants\par

## 2022-05-11 NOTE — CONSULT LETTER
[Dear  ___] : Dear  [unfilled], [( Thank you for referring [unfilled] for consultation for _____ )] : Thank you for referring [unfilled] for consultation for [unfilled] [Please see my note below.] : Please see my note below. [Consult Closing:] : Thank you very much for allowing me to participate in the care of this patient.  If you have any questions, please do not hesitate to contact me. [Sincerely,] : Sincerely, [DrTutu  ___] : Dr. KNIGHT [DrTutu ___] : Dr. KNIGHT [FreeTextEntry2] : Lissa Mederos MD\par [address]\par [place] [id number] [FreeTextEntry3] : Raven Forde MD\par Associate Chief \par [facility]\par [facility]\par  of Medicine\par [employer]\par \par

## 2022-05-13 ENCOUNTER — RESULT REVIEW (OUTPATIENT)
Age: 47
End: 2022-05-13

## 2022-05-13 ENCOUNTER — TRANSCRIPTION ENCOUNTER (OUTPATIENT)
Age: 47
End: 2022-05-13

## 2022-05-13 ENCOUNTER — OUTPATIENT (OUTPATIENT)
Dept: OUTPATIENT SERVICES | Facility: HOSPITAL | Age: 47
LOS: 1 days | End: 2022-05-13
Payer: COMMERCIAL

## 2022-05-13 VITALS
RESPIRATION RATE: 14 BRPM | DIASTOLIC BLOOD PRESSURE: 90 MMHG | HEART RATE: 68 BPM | SYSTOLIC BLOOD PRESSURE: 128 MMHG | OXYGEN SATURATION: 99 %

## 2022-05-13 VITALS
OXYGEN SATURATION: 98 % | HEART RATE: 88 BPM | WEIGHT: 192.02 LBS | TEMPERATURE: 98 F | HEIGHT: 68 IN | RESPIRATION RATE: 16 BRPM | DIASTOLIC BLOOD PRESSURE: 100 MMHG | SYSTOLIC BLOOD PRESSURE: 137 MMHG

## 2022-05-13 DIAGNOSIS — C50.912 MALIGNANT NEOPLASM OF UNSPECIFIED SITE OF LEFT FEMALE BREAST: ICD-10-CM

## 2022-05-13 DIAGNOSIS — Z98.890 OTHER SPECIFIED POSTPROCEDURAL STATES: Chronic | ICD-10-CM

## 2022-05-13 DIAGNOSIS — K08.409 PARTIAL LOSS OF TEETH, UNSPECIFIED CAUSE, UNSPECIFIED CLASS: Chronic | ICD-10-CM

## 2022-05-13 PROCEDURE — 76937 US GUIDE VASCULAR ACCESS: CPT | Mod: 26

## 2022-05-13 PROCEDURE — 76937 US GUIDE VASCULAR ACCESS: CPT

## 2022-05-13 PROCEDURE — 36561 INSERT TUNNELED CV CATH: CPT

## 2022-05-13 PROCEDURE — C1769: CPT

## 2022-05-13 PROCEDURE — C1788: CPT

## 2022-05-13 PROCEDURE — 77001 FLUOROGUIDE FOR VEIN DEVICE: CPT | Mod: 26

## 2022-05-13 PROCEDURE — 77001 FLUOROGUIDE FOR VEIN DEVICE: CPT

## 2022-05-13 PROCEDURE — C1894: CPT

## 2022-05-13 NOTE — PRE-ANESTHESIA EVALUATION ADULT - NSANTHOSAYNRD_GEN_A_CORE
No. ADEBAYO screening performed.  STOP BANG Legend: 0-2 = LOW Risk; 3-4 = INTERMEDIATE Risk; 5-8 = HIGH Risk

## 2022-05-13 NOTE — PROCEDURE NOTE - PROCEDURE FINDINGS AND DETAILS
8 Azeri powerport placed using US and fluoro guidance.  Tip in SVC. MAy use immediately.  Full report to follow.

## 2022-05-13 NOTE — ASU DISCHARGE PLAN (ADULT/PEDIATRIC) - NS MD DC FALL RISK RISK
For information on Fall & Injury Prevention, visit: [email or web address] OR  [email or web address] OR  https://www.cdc.gov/steadi/patient.html

## 2022-05-13 NOTE — ASU DISCHARGE PLAN (ADULT/PEDIATRIC) - NURSING INSTRUCTIONS
Please feel free to contact us at [phone] if any problems arise. After 6PM, Monday through Friday, on weekends and on holidays, please call [phone] and ask for the radiology resident on call to be paged.

## 2022-05-13 NOTE — ASU DISCHARGE PLAN (ADULT/PEDIATRIC) - ASU DC SPECIAL INSTRUCTIONSFT
Chest Port Placement    Discharge Instructions  - You have had a chest port implanted in your chest.   - The port is ready for use.  - You may shower in 48 hours. No soaking or swimming for 2 weeks or until the site is completely healed.  - Keep the area covered and dry for the next 2 days. It may be removed by a chemotherapy nurse as needed for treatment.  -Do not remove steri strips, they will fall in 1-2 weeks.  - Do not perform any heavy lifting or put tension on the area for the next week or until the site is healed.  - You may resume your normal diet.  - You may resume your normal medications however you should wait 48 hours before restarting aspirin, plavix, or blood thinners.  - It is normal to experience some pain over the site for the next few days. Take Tylenol for that pain. Do not take more frequently than every 6 hours and do not exceed more than 3000mg of Tylenol in a 24 hour period.  - You were given conscious sedation which may make you drowsy, therefore you need someone to stay with you until the morning following the procedure.  - Do not drive, engage in heavy lifting or strenuous activity, or drink any alcoholic beverages for the next 24 hours.   - You may resume normal activity in 24 hours.    Notify your primary physician and/or Interventional Radiology IMMEDIATELY if you experience any of the following       - Fever of 101F or 38C       - Chills or Rigors/ Shakes       - Swelling and/or Redness in the area around the port       - Worsening Pain       - Blood soaked bandages or worsening bleeding       - Lightheadedness and/or dizziness upon standing       - Chest Pain/ Tightness       - Shortness of Breath       - Difficulty walking    If you have a problem that you believe requires IMMEDIATE attention, please go to your NEAREST Emergency Room. If you believe your problem can safely wait until you speak to a physician, please call Interventional Radiology for any concerns.

## 2022-05-13 NOTE — PROGRESS NOTE ADULT - SUBJECTIVE AND OBJECTIVE BOX
Interventional Radiology    HPI: 47y Female with left breast ca for venous accee    Allergies: adhesives (Blisters; Rash)  sulfa drugs (Rash; Urticaria; Hives)    Medications (Abx/Cardiac/Anticoagulation/Blood Products)      Data:  172.7  87.1  T(C): 36.8  HR: 88  BP: 137/100  RR: 16  SpO2: 98%    Exam  General: No acute distress  Chest: Non labored breathing  Abdomen: Non-distended  Extremities: No swelling, warm          Imaging:     Plan: 47y Female presents for chest port.  -Risks/Benefits/alternatives explained with the patient and/or healthcare proxy and witnessed informed consent obtained.

## 2022-05-13 NOTE — ASU PATIENT PROFILE, ADULT - FALL HARM RISK - UNIVERSAL INTERVENTIONS
Bed in lowest position, wheels locked, appropriate side rails in place/Call bell, personal items and telephone in reach/Instruct patient to call for assistance before getting out of bed or chair/Non-slip footwear when patient is out of bed/[place] to call system/Physically safe environment - no spills, clutter or unnecessary equipment/Purposeful Proactive Rounding/Room/bathroom lighting operational, light cord in reach

## 2022-05-17 ENCOUNTER — APPOINTMENT (OUTPATIENT)
Dept: PLASTIC SURGERY | Facility: CLINIC | Age: 47
End: 2022-05-17
Payer: COMMERCIAL

## 2022-05-17 VITALS
BODY MASS INDEX: 29.1 KG/M2 | SYSTOLIC BLOOD PRESSURE: 124 MMHG | TEMPERATURE: 96.8 F | OXYGEN SATURATION: 98 % | HEART RATE: 74 BPM | WEIGHT: 192 LBS | RESPIRATION RATE: 14 BRPM | HEIGHT: 68 IN | DIASTOLIC BLOOD PRESSURE: 86 MMHG

## 2022-05-17 PROCEDURE — 99024 POSTOP FOLLOW-UP VISIT: CPT

## 2022-05-17 NOTE — ASSESSMENT
[FreeTextEntry1] : 48 yo female s/p b/l AMINA flap reconstruction 3/14/22\par silver nitrate applied to wound again today\par discussed daily dressing changes\par monitor for redness, swelling, fever, chills\par no heavy lifting or strenuous activity\par continue to wear soft, non wire bra\par all pt questions answered\par

## 2022-05-18 ENCOUNTER — APPOINTMENT (OUTPATIENT)
Dept: HEMATOLOGY ONCOLOGY | Facility: CLINIC | Age: 47
End: 2022-05-18

## 2022-05-18 ENCOUNTER — RESULT REVIEW (OUTPATIENT)
Age: 47
End: 2022-05-18

## 2022-05-18 ENCOUNTER — APPOINTMENT (OUTPATIENT)
Dept: INFUSION THERAPY | Facility: HOSPITAL | Age: 47
End: 2022-05-18

## 2022-05-18 ENCOUNTER — NON-APPOINTMENT (OUTPATIENT)
Age: 47
End: 2022-05-18

## 2022-05-18 DIAGNOSIS — Z45.2 ENCOUNTER FOR ADJUSTMENT AND MANAGEMENT OF VASCULAR ACCESS DEVICE: ICD-10-CM

## 2022-05-18 DIAGNOSIS — C50.912 MALIGNANT NEOPLASM OF UNSPECIFIED SITE OF LEFT FEMALE BREAST: ICD-10-CM

## 2022-05-18 LAB
BASOPHILS # BLD AUTO: 0.06 K/UL — SIGNIFICANT CHANGE UP (ref 0–0.2)
BASOPHILS NFR BLD AUTO: 0.9 % — SIGNIFICANT CHANGE UP (ref 0–2)
EOSINOPHIL # BLD AUTO: 0.18 K/UL — SIGNIFICANT CHANGE UP (ref 0–0.5)
EOSINOPHIL NFR BLD AUTO: 2.6 % — SIGNIFICANT CHANGE UP (ref 0–6)
HCT VFR BLD CALC: 41.7 % — SIGNIFICANT CHANGE UP (ref 34.5–45)
HGB BLD-MCNC: 14.4 G/DL — SIGNIFICANT CHANGE UP (ref 11.5–15.5)
IMM GRANULOCYTES NFR BLD AUTO: 0.6 % — SIGNIFICANT CHANGE UP (ref 0–1.5)
LYMPHOCYTES # BLD AUTO: 2.37 K/UL — SIGNIFICANT CHANGE UP (ref 1–3.3)
LYMPHOCYTES # BLD AUTO: 33.6 % — SIGNIFICANT CHANGE UP (ref 13–44)
MCHC RBC-ENTMCNC: 31.2 PG — SIGNIFICANT CHANGE UP (ref 27–34)
MCHC RBC-ENTMCNC: 34.5 G/DL — SIGNIFICANT CHANGE UP (ref 32–36)
MCV RBC AUTO: 90.5 FL — SIGNIFICANT CHANGE UP (ref 80–100)
MONOCYTES # BLD AUTO: 0.53 K/UL — SIGNIFICANT CHANGE UP (ref 0–0.9)
MONOCYTES NFR BLD AUTO: 7.5 % — SIGNIFICANT CHANGE UP (ref 2–14)
NEUTROPHILS # BLD AUTO: 3.87 K/UL — SIGNIFICANT CHANGE UP (ref 1.8–7.4)
NEUTROPHILS NFR BLD AUTO: 54.8 % — SIGNIFICANT CHANGE UP (ref 43–77)
NRBC # BLD: 0 /100 WBCS — SIGNIFICANT CHANGE UP (ref 0–0)
PLATELET # BLD AUTO: 361 K/UL — SIGNIFICANT CHANGE UP (ref 150–400)
RBC # BLD: 4.61 M/UL — SIGNIFICANT CHANGE UP (ref 3.8–5.2)
RBC # FLD: 11.7 % — SIGNIFICANT CHANGE UP (ref 10.3–14.5)
WBC # BLD: 7.05 K/UL — SIGNIFICANT CHANGE UP (ref 3.8–10.5)
WBC # FLD AUTO: 7.05 K/UL — SIGNIFICANT CHANGE UP (ref 3.8–10.5)

## 2022-05-19 ENCOUNTER — NON-APPOINTMENT (OUTPATIENT)
Age: 47
End: 2022-05-19

## 2022-05-19 DIAGNOSIS — Z51.11 ENCOUNTER FOR ANTINEOPLASTIC CHEMOTHERAPY: ICD-10-CM

## 2022-05-19 DIAGNOSIS — R11.2 NAUSEA WITH VOMITING, UNSPECIFIED: ICD-10-CM

## 2022-05-24 ENCOUNTER — APPOINTMENT (OUTPATIENT)
Dept: PLASTIC SURGERY | Facility: CLINIC | Age: 47
End: 2022-05-24

## 2022-05-25 PROBLEM — C50.912 MALIGNANT NEOPLASM OF UNSPECIFIED SITE OF LEFT FEMALE BREAST: Chronic | Status: ACTIVE | Noted: 2022-05-17

## 2022-05-26 ENCOUNTER — OUTPATIENT (OUTPATIENT)
Dept: OUTPATIENT SERVICES | Facility: HOSPITAL | Age: 47
LOS: 1 days | Discharge: HOME | End: 2022-05-26

## 2022-05-26 DIAGNOSIS — Z98.890 OTHER SPECIFIED POSTPROCEDURAL STATES: Chronic | ICD-10-CM

## 2022-05-26 DIAGNOSIS — K08.409 PARTIAL LOSS OF TEETH, UNSPECIFIED CAUSE, UNSPECIFIED CLASS: Chronic | ICD-10-CM

## 2022-05-26 DIAGNOSIS — Z90.11 ACQUIRED ABSENCE OF RIGHT BREAST AND NIPPLE: Chronic | ICD-10-CM

## 2022-05-26 DIAGNOSIS — C50.919 MALIGNANT NEOPLASM OF UNSPECIFIED SITE OF UNSPECIFIED FEMALE BREAST: ICD-10-CM

## 2022-05-26 DIAGNOSIS — Z90.12 ACQUIRED ABSENCE OF LEFT BREAST AND NIPPLE: Chronic | ICD-10-CM

## 2022-05-31 ENCOUNTER — APPOINTMENT (OUTPATIENT)
Dept: PLASTIC SURGERY | Facility: CLINIC | Age: 47
End: 2022-05-31
Payer: COMMERCIAL

## 2022-05-31 VITALS
WEIGHT: 192 LBS | TEMPERATURE: 96.7 F | DIASTOLIC BLOOD PRESSURE: 89 MMHG | BODY MASS INDEX: 29.1 KG/M2 | HEIGHT: 68 IN | RESPIRATION RATE: 14 BRPM | HEART RATE: 80 BPM | SYSTOLIC BLOOD PRESSURE: 135 MMHG | OXYGEN SATURATION: 98 %

## 2022-05-31 PROCEDURE — 99024 POSTOP FOLLOW-UP VISIT: CPT

## 2022-05-31 NOTE — PHYSICAL EXAM
[NI] : Normal [de-identified] : b/l breast flaps soft and symmetrical\par skin paddles viable \par incisions c/d/i\par right breast wound measuring 1 cm x 1 cm, beefy granulation tissue at base, no signs of infection \par no palpable fluid collections [de-identified] : abdomen soft and non tender\par umbilicus viable\par incisions c/d/i

## 2022-05-31 NOTE — ASSESSMENT
[FreeTextEntry1] : 48 yo female s/p AMINA reconstruction 3/14/22\par continue medi honey daily\par finish chemo as scheduled\par all questions answered\par no restrictions \par

## 2022-05-31 NOTE — HISTORY OF PRESENT ILLNESS
[FreeTextEntry1] : Ms. NIKKO KOVACS  is a 46 year  old female  with no pertinent past medical history who presents with newly diagnosed left breast cancer which was found on self exam in November.  Pt had mammogram 1/11/22, and later confirmed IDC with ultrasound guided core biopsy 1/13/22  pt was referred to the office by Dr Mederos  \par \par pt is now s/p b/l skin sparing mastectomy with reconstruction using AMINA flap and alloderm 3/14/22\par pt doing well\par Denies fever, chills, LE pain/edema\par pt has been applying medi honey after showers with significant improvement since last visit\par \par chemo 5/18 - end of June

## 2022-06-03 ENCOUNTER — RESULT REVIEW (OUTPATIENT)
Age: 47
End: 2022-06-03

## 2022-06-03 ENCOUNTER — APPOINTMENT (OUTPATIENT)
Dept: HEMATOLOGY ONCOLOGY | Facility: CLINIC | Age: 47
End: 2022-06-03
Payer: COMMERCIAL

## 2022-06-03 ENCOUNTER — APPOINTMENT (OUTPATIENT)
Dept: INFUSION THERAPY | Facility: HOSPITAL | Age: 47
End: 2022-06-03

## 2022-06-03 VITALS
SYSTOLIC BLOOD PRESSURE: 139 MMHG | DIASTOLIC BLOOD PRESSURE: 95 MMHG | HEART RATE: 76 BPM | BODY MASS INDEX: 29.19 KG/M2 | WEIGHT: 192 LBS | TEMPERATURE: 96.8 F | RESPIRATION RATE: 13 BRPM | OXYGEN SATURATION: 99 %

## 2022-06-03 DIAGNOSIS — L53.9 ERYTHEMATOUS CONDITION, UNSPECIFIED: ICD-10-CM

## 2022-06-03 LAB
BASOPHILS # BLD AUTO: 0.06 K/UL — SIGNIFICANT CHANGE UP (ref 0–0.2)
BASOPHILS NFR BLD AUTO: 1.3 % — SIGNIFICANT CHANGE UP (ref 0–2)
EOSINOPHIL # BLD AUTO: 0.06 K/UL — SIGNIFICANT CHANGE UP (ref 0–0.5)
EOSINOPHIL NFR BLD AUTO: 1.3 % — SIGNIFICANT CHANGE UP (ref 0–6)
HCT VFR BLD CALC: 38.3 % — SIGNIFICANT CHANGE UP (ref 34.5–45)
HGB BLD-MCNC: 13.3 G/DL — SIGNIFICANT CHANGE UP (ref 11.5–15.5)
IMM GRANULOCYTES NFR BLD AUTO: 1.8 % — HIGH (ref 0–1.5)
LYMPHOCYTES # BLD AUTO: 1.52 K/UL — SIGNIFICANT CHANGE UP (ref 1–3.3)
LYMPHOCYTES # BLD AUTO: 33.7 % — SIGNIFICANT CHANGE UP (ref 13–44)
MCHC RBC-ENTMCNC: 31.5 PG — SIGNIFICANT CHANGE UP (ref 27–34)
MCHC RBC-ENTMCNC: 34.7 G/DL — SIGNIFICANT CHANGE UP (ref 32–36)
MCV RBC AUTO: 90.8 FL — SIGNIFICANT CHANGE UP (ref 80–100)
MONOCYTES # BLD AUTO: 0.44 K/UL — SIGNIFICANT CHANGE UP (ref 0–0.9)
MONOCYTES NFR BLD AUTO: 9.8 % — SIGNIFICANT CHANGE UP (ref 2–14)
NEUTROPHILS # BLD AUTO: 2.35 K/UL — SIGNIFICANT CHANGE UP (ref 1.8–7.4)
NEUTROPHILS NFR BLD AUTO: 52.1 % — SIGNIFICANT CHANGE UP (ref 43–77)
NRBC # BLD: 0 /100 WBCS — SIGNIFICANT CHANGE UP (ref 0–0)
PLATELET # BLD AUTO: 352 K/UL — SIGNIFICANT CHANGE UP (ref 150–400)
RBC # BLD: 4.22 M/UL — SIGNIFICANT CHANGE UP (ref 3.8–5.2)
RBC # FLD: 12.1 % — SIGNIFICANT CHANGE UP (ref 10.3–14.5)
WBC # BLD: 4.51 K/UL — SIGNIFICANT CHANGE UP (ref 3.8–10.5)
WBC # FLD AUTO: 4.51 K/UL — SIGNIFICANT CHANGE UP (ref 3.8–10.5)

## 2022-06-03 PROCEDURE — 99214 OFFICE O/P EST MOD 30 MIN: CPT

## 2022-06-04 PROBLEM — L53.9 BREAST ERYTHEMA: Status: RESOLVED | Noted: 2022-03-22 | Resolved: 2022-06-04

## 2022-06-04 NOTE — PHYSICAL EXAM
[Fully active, able to carry on all pre-disease performance without restriction] : Status 0 - Fully active, able to carry on all pre-disease performance without restriction [Normal] : affect appropriate [de-identified] : s/p Bilateral mastectomies with reconstruction and well healing incisions; right breast wound with 1 cm opening, good granulation tissue, no drainage

## 2022-06-04 NOTE — HISTORY OF PRESENT ILLNESS
[Disease: _____________________] : Disease: [unfilled] [T: ___] : T[unfilled] [N: ___] : N[unfilled] [M: ___] : M[unfilled] [AJCC Stage: ____] : AJCC Stage: [unfilled] [Date: ____________] : Patient's last distress assessment performed on [unfilled]. [3 - Distress Level] : Distress Level: 3 [Patient given social work contact information and resource sheet] : Patient was given social work contact information and resource sheet [de-identified] : Left breast cancer diagnosed at the age of 46 \par 1/22 The patient noted a mass in her left breast\par 01/11/22 Mammogram and US revealed an indeterminate 2.1 cm mass in the left breast 1 o'clock axis 10 cm FN. TREVON in right breast.\par 01/13/22 Left breast 1 o'clock axis US guided core biopsy: a 0.8 cm IDC, SBR 6/9, ER >90%, [place] 73%, HER-2 negative, Ki-67 90% and P53 5%\par 01/25/22 [facility] Next panel of 36 gene negative for pathogenic variants\par 02/01/22 MRI of the breast revealed focal non mass enhancement in the right breast 9 o'clock axis 9 cm FN for which MRI guided biopsy was recommended. Enhancing mass in the right breast 7 o'clock axis 6 cm FN, for which MRI guided core biopsy  was recommended. Biopsy proven malignancy in the left breast 1 o'clock axis for which surgical and oncologic management is recommended. Questionably prominent left axillary LN, for which targeted US is recommended. \par 02/11/22 Oncotype DX breast recurrence score = 51 with predicted risk of distant recurrence at 9 years of >39% with Tamoxifen alone. Absolute chemotherapy benefit of >15%\par 02/17/22 Left axillary US showed normal left axillary lymph nodes.\par 03/14/22 Bilateral mastectomies by Dr. Lissa Mederos and AMINA flap reconstruction with Dr. Ramirez and Dr. Nas Millan\par Left mastectomy: 2.1 x 1.8 x 1.5 cm IDC, SBR 9/9 with 0/6 SLN involved and LVI identified\par Right mastectomy: Fibrocystic mastopathy, proliferative, with florid ductal hyperplasia and fibroadenomatoid change. Focal PASH with 0/2 LN\par 5/18/22 Adjuvant chemotherapy with Adriamycin (60 mg/m2) & Cytoxan (600 mg/m2) with Neulasta x 4 cycles to be followed by Taxol (175 mg/m2) x 4 cycles, all every 2 weeks\par \par  [de-identified] : 2.1 cm IDC, SBR 9/9, +LVI, 0/6 SLN, ER >90%, [place] 73%, HER-2 negative, Ki-67 90%, P53 5%, Oncotype = 51 (RR 39%) [de-identified] : Zarina comes today for cycle #2 AC with Onpro. She tolerated cycle 1 well with slight changes in her taste buds. She had mild nausea the first week, especially days 4-6, but no vomiting. She did not take the Reglan. She had one mild mouth sore that lasted for a few days, which Biotene helped. She had fatigue and felt back to normal by days 6-7.\par The right breast wound is closing up, with 1 cm opening and good granulation tissue and no signs of infection.\par Her son's birthday party is tomorrow and she is planning to shave her hair the next day as it is starting to fall out.\par She is  with two children ages 6 and 10. She is a [employer]  and currently on medical leave.\par \par HEALTH MAINTENANCE:\par PCP: Krys Ayala MD\par GYN: Pallavi Angel MD\par Mammogram: s/p bilateral mastectomies\par Pap smear:  negative\par Colonoscopy: none \par Genetic testin22 [employer] genetics Cancer Next gene panel with 36 genes negative for pathogenic variants\par

## 2022-06-06 DIAGNOSIS — R11.2 NAUSEA WITH VOMITING, UNSPECIFIED: ICD-10-CM

## 2022-06-06 DIAGNOSIS — Z51.89 ENCOUNTER FOR OTHER SPECIFIED AFTERCARE: ICD-10-CM

## 2022-06-14 ENCOUNTER — APPOINTMENT (OUTPATIENT)
Dept: PLASTIC SURGERY | Facility: CLINIC | Age: 47
End: 2022-06-14

## 2022-06-14 VITALS
BODY MASS INDEX: 30.16 KG/M2 | HEART RATE: 60 BPM | HEIGHT: 68 IN | SYSTOLIC BLOOD PRESSURE: 142 MMHG | DIASTOLIC BLOOD PRESSURE: 91 MMHG | OXYGEN SATURATION: 97 % | WEIGHT: 199 LBS | TEMPERATURE: 96.7 F

## 2022-06-14 PROCEDURE — 99212 OFFICE O/P EST SF 10 MIN: CPT

## 2022-06-14 NOTE — ASSESSMENT
[FreeTextEntry1] : 48 yo female s/p AMINA flap reconstruction 3/14/22\par pt seen and examined with Dr. Ramirez\par discussed finishing chemo as planned\par no restrictions\par all questions answered\par

## 2022-06-14 NOTE — PHYSICAL EXAM
[NI] : Normal [de-identified] : b/l breast flaps soft and symmetrical\par skin paddles viable \par incisions c/d/i\par right breast wound healed\par no signs of infection \par no palpable fluid collections [de-identified] : abdomen soft and non tender\par umbilicus viable\par incisions c/d/i

## 2022-06-14 NOTE — HISTORY OF PRESENT ILLNESS
[FreeTextEntry1] : Ms. NIKKO KOVACS  is a 46 year  old female  with no pertinent past medical history who presents with newly diagnosed left breast cancer which was found on self exam in November.  Pt had mammogram 1/11/22, and later confirmed IDC with ultrasound guided core biopsy 1/13/22  pt was referred to the office by Dr Mederos  \par \par pt is now s/p b/l skin sparing mastectomy with reconstruction using AMINA flap and alloderm 3/14/22\par pt doing well\par Denies fever, chills, LE pain/edema\par \par chemo 5/18 - end of August

## 2022-06-15 ENCOUNTER — NON-APPOINTMENT (OUTPATIENT)
Age: 47
End: 2022-06-15

## 2022-06-16 ENCOUNTER — NON-APPOINTMENT (OUTPATIENT)
Age: 47
End: 2022-06-16

## 2022-06-16 ENCOUNTER — RESULT REVIEW (OUTPATIENT)
Age: 47
End: 2022-06-16

## 2022-06-16 ENCOUNTER — APPOINTMENT (OUTPATIENT)
Dept: ULTRASOUND IMAGING | Facility: IMAGING CENTER | Age: 47
End: 2022-06-16
Payer: COMMERCIAL

## 2022-06-16 ENCOUNTER — APPOINTMENT (OUTPATIENT)
Dept: HEMATOLOGY ONCOLOGY | Facility: CLINIC | Age: 47
End: 2022-06-16
Payer: COMMERCIAL

## 2022-06-16 ENCOUNTER — OUTPATIENT (OUTPATIENT)
Dept: OUTPATIENT SERVICES | Facility: HOSPITAL | Age: 47
LOS: 1 days | End: 2022-06-16
Payer: COMMERCIAL

## 2022-06-16 VITALS
TEMPERATURE: 97 F | HEART RATE: 84 BPM | SYSTOLIC BLOOD PRESSURE: 136 MMHG | WEIGHT: 195.11 LBS | DIASTOLIC BLOOD PRESSURE: 92 MMHG | OXYGEN SATURATION: 99 % | RESPIRATION RATE: 14 BRPM | BODY MASS INDEX: 29.67 KG/M2

## 2022-06-16 DIAGNOSIS — K08.409 PARTIAL LOSS OF TEETH, UNSPECIFIED CAUSE, UNSPECIFIED CLASS: Chronic | ICD-10-CM

## 2022-06-16 DIAGNOSIS — Z90.12 ACQUIRED ABSENCE OF LEFT BREAST AND NIPPLE: Chronic | ICD-10-CM

## 2022-06-16 DIAGNOSIS — C50.912 MALIGNANT NEOPLASM OF UNSPECIFIED SITE OF LEFT FEMALE BREAST: ICD-10-CM

## 2022-06-16 DIAGNOSIS — Z90.11 ACQUIRED ABSENCE OF RIGHT BREAST AND NIPPLE: Chronic | ICD-10-CM

## 2022-06-16 DIAGNOSIS — Z98.890 OTHER SPECIFIED POSTPROCEDURAL STATES: Chronic | ICD-10-CM

## 2022-06-16 LAB
BASOPHILS # BLD AUTO: 0.09 K/UL — SIGNIFICANT CHANGE UP (ref 0–0.2)
BASOPHILS NFR BLD AUTO: 1 % — SIGNIFICANT CHANGE UP (ref 0–2)
EOSINOPHIL # BLD AUTO: 0.13 K/UL — SIGNIFICANT CHANGE UP (ref 0–0.5)
EOSINOPHIL NFR BLD AUTO: 1.4 % — SIGNIFICANT CHANGE UP (ref 0–6)
HCT VFR BLD CALC: 36.1 % — SIGNIFICANT CHANGE UP (ref 34.5–45)
HGB BLD-MCNC: 12.4 G/DL — SIGNIFICANT CHANGE UP (ref 11.5–15.5)
IMM GRANULOCYTES NFR BLD AUTO: 6.2 % — HIGH (ref 0–1.5)
LYMPHOCYTES # BLD AUTO: 1.7 K/UL — SIGNIFICANT CHANGE UP (ref 1–3.3)
LYMPHOCYTES # BLD AUTO: 18.4 % — SIGNIFICANT CHANGE UP (ref 13–44)
MCHC RBC-ENTMCNC: 31.2 PG — SIGNIFICANT CHANGE UP (ref 27–34)
MCHC RBC-ENTMCNC: 34.3 G/DL — SIGNIFICANT CHANGE UP (ref 32–36)
MCV RBC AUTO: 90.7 FL — SIGNIFICANT CHANGE UP (ref 80–100)
MONOCYTES # BLD AUTO: 0.79 K/UL — SIGNIFICANT CHANGE UP (ref 0–0.9)
MONOCYTES NFR BLD AUTO: 8.6 % — SIGNIFICANT CHANGE UP (ref 2–14)
NEUTROPHILS # BLD AUTO: 5.95 K/UL — SIGNIFICANT CHANGE UP (ref 1.8–7.4)
NEUTROPHILS NFR BLD AUTO: 64.4 % — SIGNIFICANT CHANGE UP (ref 43–77)
NRBC # BLD: 0 /100 WBCS — SIGNIFICANT CHANGE UP (ref 0–0)
PLAT MORPH BLD: NORMAL — SIGNIFICANT CHANGE UP
PLATELET # BLD AUTO: 238 K/UL — SIGNIFICANT CHANGE UP (ref 150–400)
RBC # BLD: 3.98 M/UL — SIGNIFICANT CHANGE UP (ref 3.8–5.2)
RBC # FLD: 12.9 % — SIGNIFICANT CHANGE UP (ref 10.3–14.5)
RBC BLD AUTO: SIGNIFICANT CHANGE UP
WBC # BLD: 9.23 K/UL — SIGNIFICANT CHANGE UP (ref 3.8–10.5)
WBC # FLD AUTO: 9.23 K/UL — SIGNIFICANT CHANGE UP (ref 3.8–10.5)

## 2022-06-16 PROCEDURE — 93971 EXTREMITY STUDY: CPT | Mod: 26,RT

## 2022-06-16 PROCEDURE — 99213 OFFICE O/P EST LOW 20 MIN: CPT

## 2022-06-16 PROCEDURE — 93971 EXTREMITY STUDY: CPT

## 2022-06-17 ENCOUNTER — APPOINTMENT (OUTPATIENT)
Dept: HEMATOLOGY ONCOLOGY | Facility: CLINIC | Age: 47
End: 2022-06-17
Payer: COMMERCIAL

## 2022-06-17 ENCOUNTER — APPOINTMENT (OUTPATIENT)
Dept: ULTRASOUND IMAGING | Facility: IMAGING CENTER | Age: 47
End: 2022-06-17

## 2022-06-17 ENCOUNTER — APPOINTMENT (OUTPATIENT)
Dept: INFUSION THERAPY | Facility: HOSPITAL | Age: 47
End: 2022-06-17

## 2022-06-17 VITALS
BODY MASS INDEX: 29.33 KG/M2 | SYSTOLIC BLOOD PRESSURE: 127 MMHG | HEART RATE: 85 BPM | RESPIRATION RATE: 14 BRPM | OXYGEN SATURATION: 98 % | DIASTOLIC BLOOD PRESSURE: 89 MMHG | TEMPERATURE: 97 F | WEIGHT: 192.9 LBS

## 2022-06-17 PROCEDURE — 99214 OFFICE O/P EST MOD 30 MIN: CPT

## 2022-06-17 RX ORDER — APIXABAN 5 MG/1
5 TABLET, FILM COATED ORAL
Qty: 60 | Refills: 0 | Status: DISCONTINUED | COMMUNITY
Start: 2022-06-16 | End: 2022-06-17

## 2022-06-17 NOTE — PHYSICAL EXAM
[Fully active, able to carry on all pre-disease performance without restriction] : Status 0 - Fully active, able to carry on all pre-disease performance without restriction [Normal] : RRR, normal S1S2, no murmurs, rubs, gallops [de-identified] : Minimal swelling of the neck along the mediport catheter site. No erythema, or warmth to the touch

## 2022-06-17 NOTE — PHYSICAL EXAM
[Fully active, able to carry on all pre-disease performance without restriction] : Status 0 - Fully active, able to carry on all pre-disease performance without restriction [Normal] : affect appropriate [de-identified] : s/p Bilateral mastectomies with reconstruction and well healing incisions; right breast wound with 1 cm opening, good granulation tissue, no drainage

## 2022-06-17 NOTE — HISTORY OF PRESENT ILLNESS
[Disease: _____________________] : Disease: [unfilled] [T: ___] : T[unfilled] [N: ___] : N[unfilled] [M: ___] : M[unfilled] [AJCC Stage: ____] : AJCC Stage: [unfilled] [Date: ____________] : Patient's last distress assessment performed on [unfilled]. [3 - Distress Level] : Distress Level: 3 [Patient given social work contact information and resource sheet] : Patient was given social work contact information and resource sheet [de-identified] : Left breast cancer diagnosed at the age of 46 \par 1/22 The patient noted a mass in her left breast\par 01/11/22 Mammogram and US revealed an indeterminate 2.1 cm mass in the left breast 1 o'clock axis 10 cm FN. TREVON in right breast.\par 01/13/22 Left breast 1 o'clock axis US guided core biopsy: a 0.8 cm IDC, SBR 6/9, ER >90%, [place] 73%, HER-2 negative, Ki-67 90% and P53 5%\par 01/25/22 [facility] Next panel of 36 gene negative for pathogenic variants\par 02/01/22 MRI of the breast revealed focal non mass enhancement in the right breast 9 o'clock axis 9 cm FN for which MRI guided biopsy was recommended. Enhancing mass in the right breast 7 o'clock axis 6 cm FN, for which MRI guided core biopsy  was recommended. Biopsy proven malignancy in the left breast 1 o'clock axis for which surgical and oncologic management is recommended. Questionably prominent left axillary LN, for which targeted US is recommended. \par 02/11/22 Oncotype DX breast recurrence score = 51 with predicted risk of distant recurrence at 9 years of >39% with Tamoxifen alone. Absolute chemotherapy benefit of >15%\par 02/17/22 Left axillary US showed normal left axillary lymph nodes.\par 03/14/22 Bilateral mastectomies by Dr. Lissa Mederos and AMINA flap reconstruction with Dr. Ramirez and Dr. Nas Millan\par Left mastectomy: 2.1 x 1.8 x 1.5 cm IDC, SBR 9/9 with 0/6 SLN involved and LVI identified\par Right mastectomy: Fibrocystic mastopathy, proliferative, with florid ductal hyperplasia and fibroadenomatoid change. Focal PASH with 0/2 LN\par 5/18/22 Adjuvant chemotherapy with Adriamycin (60 mg/m2) & Cytoxan (600 mg/m2) with Neulasta x 4 cycles to be followed by Taxol (175 mg/m2) x 4 cycles, all every 2 weeks\par \par  [de-identified] : 2.1 cm IDC, SBR 9/9, +LVI, 0/6 SLN, ER >90%, [place] 73%, HER-2 negative, Ki-67 90%, P53 5%, Oncotype = 51 (RR 39%) [de-identified] : Zarina comes today for cycle #3  AC with Onpro. \par She had nausea that lasted a little longer than with the first two cycles, but no vomiting. She is taking the Reglan and has alfred candy. No heartburn.\par She also had constipation for a day and then diarrhea for a couple of days.\par She notes significant fatigue days 3-4, which then improve and is lasting overall about a week.\par 22 Duplex study of the right upper extremity: Occlusive thrombus within the right internal jugular and brachiocephalic veins. Nonocclusive thrombus within the right subclavian vein.\par She is  with two children ages 6 and 10. She is a [employer]  and currently on medical leave.\par \par HEALTH MAINTENANCE:\par PCP: Krys Ayala MD\par GYN: Pallavi Angel MD\par Mammogram: s/p bilateral mastectomies\par Pap smear:  negative\par Colonoscopy: none \par Genetic testin22 [facility] Cancer Next gene panel with 36 genes negative for pathogenic variants\par

## 2022-06-17 NOTE — HISTORY OF PRESENT ILLNESS
[Disease: _____________________] : Disease: [unfilled] [T: ___] : T[unfilled] [N: ___] : N[unfilled] [M: ___] : M[unfilled] [AJCC Stage: ____] : AJCC Stage: [unfilled] [de-identified] : Left breast cancer diagnosed at the age of 46 \par 1/22 The patient noted a mass in her left breast\par 01/11/22 Mammogram and US revealed an indeterminate 2.1 cm mass in the left breast 1 o'clock axis 10 cm FN. TREVON in right breast.\par 01/13/22 Left breast 1 o'clock axis US guided core biopsy: a 0.8 cm IDC, SBR 6/9, ER >90%, [place] 73%, HER-2 negative, Ki-67 90% and P53 5%\par 01/25/22 [facility] Next panel of 36 gene negative for pathogenic variants\par 02/01/22 MRI of the breast revealed focal non mass enhancement in the right breast 9 o'clock axis 9 cm FN for which MRI guided biopsy was recommended. Enhancing mass in the right breast 7 o'clock axis 6 cm FN, for which MRI guided core biopsy  was recommended. Biopsy proven malignancy in the left breast 1 o'clock axis for which surgical and oncologic management is recommended. Questionably prominent left axillary LN, for which targeted US is recommended. \par 02/11/22 Oncotype DX breast recurrence score = 51 with predicted risk of distant recurrence at 9 years of >39% with Tamoxifen alone. Absolute chemotherapy benefit of >15%\par 02/17/22 Left axillary US showed normal left axillary lymph nodes.\par 03/14/22 Bilateral mastectomies by Dr. Lissa Mederos and AMINA flap reconstruction with Dr. Ramirez and Dr. Nas Millan\par Left mastectomy: 2.1 x 1.8 x 1.5 cm IDC, SBR 9/9 with 0/6 SLN involved and LVI identified\par Right mastectomy: Fibrocystic mastopathy, proliferative, with florid ductal hyperplasia and fibroadenomatoid change. Focal PASH with 0/2 LN\par 5/18/22 Adjuvant chemotherapy with Adriamycin (60 mg/m2) & Cytoxan (600 mg/m2) with Neulasta x 4 cycles to be followed by Taxol (175 mg/m2) x 4 cycles, all every 2 weeks\par \par  [de-identified] : 2.1 cm IDC, SBR 9/9, +LVI, 0/6 SLN, ER >90%, [place] 73%, HER-2 negative, Ki-67 90%, P53 5%, Oncotype = 51 (RR 39%) [de-identified] : Zarina presents to the office for a urgent visit\par She c/o right sided neck pain/soreness, swelling and tenderness to the touch since Monday (same side as the Mediport)\par Pain aggravated with certain movement and to touch. She denies any fever, chills, or night sweat, no difficulty swallowing solid or liquids.\par Attempted to have her get a Mediport study for today but unfortunately couldn't get a stat appt.\par She is feeling slightly better today with decrease in the swelling as she is now able to see the catheter in the neck\par Chemotherapy AC cycle # 3 is scheduled for tomorrow.\par \par HEALTH MAINTENANCE:\par PCP: Krys Ayala MD\par GYN: Pallavi Angel MD\par Mammogram: s/p bilateral mastectomies\par Pap smear:  negative\par Colonoscopy: none \par Genetic testin22 [employer] genetics Cancer Next gene panel with 36 genes negative for pathogenic variants\par

## 2022-06-20 ENCOUNTER — APPOINTMENT (OUTPATIENT)
Dept: ENDOVASCULAR SURGERY | Facility: CLINIC | Age: 47
End: 2022-06-20

## 2022-07-01 ENCOUNTER — APPOINTMENT (OUTPATIENT)
Dept: HEMATOLOGY ONCOLOGY | Facility: CLINIC | Age: 47
End: 2022-07-01

## 2022-07-01 ENCOUNTER — APPOINTMENT (OUTPATIENT)
Dept: INFUSION THERAPY | Facility: HOSPITAL | Age: 47
End: 2022-07-01

## 2022-07-01 ENCOUNTER — RESULT REVIEW (OUTPATIENT)
Age: 47
End: 2022-07-01

## 2022-07-01 VITALS
TEMPERATURE: 97.3 F | BODY MASS INDEX: 29.5 KG/M2 | SYSTOLIC BLOOD PRESSURE: 128 MMHG | RESPIRATION RATE: 16 BRPM | HEART RATE: 100 BPM | OXYGEN SATURATION: 96 % | WEIGHT: 194.01 LBS | DIASTOLIC BLOOD PRESSURE: 93 MMHG

## 2022-07-01 DIAGNOSIS — Z45.2 ENCOUNTER FOR ADJUSTMENT AND MANAGEMENT OF VASCULAR ACCESS DEVICE: ICD-10-CM

## 2022-07-01 LAB
BASOPHILS # BLD AUTO: 0.15 K/UL — SIGNIFICANT CHANGE UP (ref 0–0.2)
BASOPHILS NFR BLD AUTO: 3 % — HIGH (ref 0–2)
EOSINOPHIL # BLD AUTO: 0.05 K/UL — SIGNIFICANT CHANGE UP (ref 0–0.5)
EOSINOPHIL NFR BLD AUTO: 1 % — SIGNIFICANT CHANGE UP (ref 0–6)
HCT VFR BLD CALC: 32.6 % — LOW (ref 34.5–45)
HGB BLD-MCNC: 11.3 G/DL — LOW (ref 11.5–15.5)
LYMPHOCYTES # BLD AUTO: 1.06 K/UL — SIGNIFICANT CHANGE UP (ref 1–3.3)
LYMPHOCYTES # BLD AUTO: 21 % — SIGNIFICANT CHANGE UP (ref 13–44)
MCHC RBC-ENTMCNC: 31.7 PG — SIGNIFICANT CHANGE UP (ref 27–34)
MCHC RBC-ENTMCNC: 34.7 G/DL — SIGNIFICANT CHANGE UP (ref 32–36)
MCV RBC AUTO: 91.3 FL — SIGNIFICANT CHANGE UP (ref 80–100)
METAMYELOCYTES # FLD: 2 % — HIGH (ref 0–0)
MONOCYTES # BLD AUTO: 0.66 K/UL — SIGNIFICANT CHANGE UP (ref 0–0.9)
MONOCYTES NFR BLD AUTO: 13 % — SIGNIFICANT CHANGE UP (ref 2–14)
MYELOCYTES NFR BLD: 3 % — HIGH (ref 0–0)
NEUTROPHILS # BLD AUTO: 2.88 K/UL — SIGNIFICANT CHANGE UP (ref 1.8–7.4)
NEUTROPHILS NFR BLD AUTO: 57 % — SIGNIFICANT CHANGE UP (ref 43–77)
NRBC # BLD: 1 /100 — HIGH (ref 0–0)
NRBC # BLD: SIGNIFICANT CHANGE UP /100 WBCS (ref 0–0)
PLAT MORPH BLD: NORMAL — SIGNIFICANT CHANGE UP
PLATELET # BLD AUTO: 258 K/UL — SIGNIFICANT CHANGE UP (ref 150–400)
RBC # BLD: 3.57 M/UL — LOW (ref 3.8–5.2)
RBC # FLD: 14.4 % — SIGNIFICANT CHANGE UP (ref 10.3–14.5)
RBC BLD AUTO: SIGNIFICANT CHANGE UP
WBC # BLD: 5.06 K/UL — SIGNIFICANT CHANGE UP (ref 3.8–10.5)
WBC # FLD AUTO: 5.06 K/UL — SIGNIFICANT CHANGE UP (ref 3.8–10.5)

## 2022-07-01 PROCEDURE — 99214 OFFICE O/P EST MOD 30 MIN: CPT

## 2022-07-01 NOTE — HISTORY OF PRESENT ILLNESS
[Disease: _____________________] : Disease: [unfilled] [T: ___] : T[unfilled] [N: ___] : N[unfilled] [M: ___] : M[unfilled] [AJCC Stage: ____] : AJCC Stage: [unfilled] [Date: ____________] : Patient's last distress assessment performed on [unfilled]. [3 - Distress Level] : Distress Level: 3 [Patient given social work contact information and resource sheet] : Patient was given social work contact information and resource sheet [de-identified] : Left breast cancer diagnosed at the age of 46 \par 1/22 The patient noted a mass in her left breast\par 01/11/22 Mammogram and US revealed an indeterminate 2.1 cm mass in the left breast 1 o'clock axis 10 cm FN. TREVON in right breast.\par 01/13/22 Left breast 1 o'clock axis US guided core biopsy: a 0.8 cm IDC, SBR 6/9, ER >90%, [place] 73%, HER-2 negative, Ki-67 90% and P53 5%\par 01/25/22 [facility] Next panel of 36 gene negative for pathogenic variants\par 02/01/22 MRI of the breast revealed focal non mass enhancement in the right breast 9 o'clock axis 9 cm FN for which MRI guided biopsy was recommended. Enhancing mass in the right breast 7 o'clock axis 6 cm FN, for which MRI guided core biopsy  was recommended. Biopsy proven malignancy in the left breast 1 o'clock axis for which surgical and oncologic management is recommended. Questionably prominent left axillary LN, for which targeted US is recommended. \par 02/11/22 Oncotype DX breast recurrence score = 51 with predicted risk of distant recurrence at 9 years of >39% with Tamoxifen alone. Absolute chemotherapy benefit of >15%\par 02/17/22 Left axillary US showed normal left axillary lymph nodes.\par 03/14/22 Bilateral mastectomies by Dr. Lissa Mederos and AMINA flap reconstruction with Dr. Ramirez and Dr. Nas Millan\par Left mastectomy: 2.1 x 1.8 x 1.5 cm IDC, SBR 9/9 with 0/6 SLN involved and LVI identified\par Right mastectomy: Fibrocystic mastopathy, proliferative, with florid ductal hyperplasia and fibroadenomatoid change. Focal PASH with 0/2 LN\par 5/18/22 Adjuvant chemotherapy with Adriamycin (60 mg/m2) & Cytoxan (600 mg/m2) with Neulasta x 4 cycles to be followed by Taxol (175 mg/m2) x 4 cycles, all every 2 weeks\par 6/16/22 DVT with occlusive thrombus within the right internal jugular and brachiocephalic veins and nonocclusive thrombus with the right subclavian vein associated with mediport. Xarelto started\par \par  [de-identified] : 2.1 cm IDC, SBR 9/9, +LVI, 0/6 SLN, ER >90%, [place] 73%, HER-2 negative, Ki-67 90%, P53 5%, Oncotype = 51 (RR 39%) [de-identified] : Zarina comes today for cycle #4  AC with Onpro. \par She had nausea as with previous cycles. She is taking the Reglan and has alfred candy. No heartburn. Her taste buds are affected and lots of things taste bad.\par She had constipation for a day and took dulcolax for one day and is resolved.\par The fatigue lasted longer and was a little worse with this last cycle\par 22 Presented with pain and swelling in right neck and  Duplex study revealed occlusive thrombus within the right internal jugular and brachiocephalic veins. Nonocclusive thrombus within the right subclavian vein. The pain started to improve within a few days of starting Xarelto.\par She is  with two children ages 6 and 10. She is a [employer]  and currently on medical leave.\par \par HEALTH MAINTENANCE:\par PCP: Krys Ayala MD\par GYN: Pallavi Angel MD\par Mammogram: s/p bilateral mastectomies\par Pap smear:  negative\par Colonoscopy: none \par Genetic testin22 [employer] [facility] Cancer Next gene panel with 36 genes negative for pathogenic variants\par

## 2022-07-01 NOTE — PHYSICAL EXAM
[Fully active, able to carry on all pre-disease performance without restriction] : Status 0 - Fully active, able to carry on all pre-disease performance without restriction [Normal] : affect appropriate [de-identified] : s/p Bilateral mastectomies with reconstruction and well healing incisions; right breast wound with 1 cm opening, good granulation tissue, no drainage

## 2022-07-13 ENCOUNTER — NON-APPOINTMENT (OUTPATIENT)
Age: 47
End: 2022-07-13

## 2022-07-14 ENCOUNTER — RESULT REVIEW (OUTPATIENT)
Age: 47
End: 2022-07-14

## 2022-07-14 ENCOUNTER — NON-APPOINTMENT (OUTPATIENT)
Age: 47
End: 2022-07-14

## 2022-07-14 ENCOUNTER — APPOINTMENT (OUTPATIENT)
Dept: HEMATOLOGY ONCOLOGY | Facility: CLINIC | Age: 47
End: 2022-07-14

## 2022-07-14 ENCOUNTER — APPOINTMENT (OUTPATIENT)
Dept: INFUSION THERAPY | Facility: HOSPITAL | Age: 47
End: 2022-07-14

## 2022-07-14 VITALS
RESPIRATION RATE: 16 BRPM | TEMPERATURE: 97.7 F | SYSTOLIC BLOOD PRESSURE: 136 MMHG | HEIGHT: 67.99 IN | DIASTOLIC BLOOD PRESSURE: 85 MMHG | OXYGEN SATURATION: 98 % | BODY MASS INDEX: 29.57 KG/M2 | WEIGHT: 195.11 LBS | HEART RATE: 115 BPM

## 2022-07-14 LAB
BASOPHILS # BLD AUTO: 0 K/UL — SIGNIFICANT CHANGE UP (ref 0–0.2)
BASOPHILS NFR BLD AUTO: 0 % — SIGNIFICANT CHANGE UP (ref 0–2)
EOSINOPHIL # BLD AUTO: 0 K/UL — SIGNIFICANT CHANGE UP (ref 0–0.5)
EOSINOPHIL NFR BLD AUTO: 0 % — SIGNIFICANT CHANGE UP (ref 0–6)
HCT VFR BLD CALC: 33.1 % — LOW (ref 34.5–45)
HGB BLD-MCNC: 11.7 G/DL — SIGNIFICANT CHANGE UP (ref 11.5–15.5)
LYMPHOCYTES # BLD AUTO: 0.85 K/UL — LOW (ref 1–3.3)
LYMPHOCYTES # BLD AUTO: 6 % — LOW (ref 13–44)
MCHC RBC-ENTMCNC: 32.1 PG — SIGNIFICANT CHANGE UP (ref 27–34)
MCHC RBC-ENTMCNC: 35.3 G/DL — SIGNIFICANT CHANGE UP (ref 32–36)
MCV RBC AUTO: 90.9 FL — SIGNIFICANT CHANGE UP (ref 80–100)
METAMYELOCYTES # FLD: 4 % — HIGH (ref 0–0)
MONOCYTES # BLD AUTO: 0.28 K/UL — SIGNIFICANT CHANGE UP (ref 0–0.9)
MONOCYTES NFR BLD AUTO: 2 % — SIGNIFICANT CHANGE UP (ref 2–14)
MYELOCYTES NFR BLD: 2 % — HIGH (ref 0–0)
NEUTROPHILS # BLD AUTO: 12.24 K/UL — HIGH (ref 1.8–7.4)
NEUTROPHILS NFR BLD AUTO: 86 % — HIGH (ref 43–77)
NRBC # BLD: 0 /100 — SIGNIFICANT CHANGE UP (ref 0–0)
NRBC # BLD: SIGNIFICANT CHANGE UP /100 WBCS (ref 0–0)
PLAT MORPH BLD: NORMAL — SIGNIFICANT CHANGE UP
PLATELET # BLD AUTO: 281 K/UL — SIGNIFICANT CHANGE UP (ref 150–400)
RBC # BLD: 3.64 M/UL — LOW (ref 3.8–5.2)
RBC # FLD: 15.7 % — HIGH (ref 10.3–14.5)
RBC BLD AUTO: SIGNIFICANT CHANGE UP
WBC # BLD: 14.23 K/UL — HIGH (ref 3.8–10.5)
WBC # FLD AUTO: 14.23 K/UL — HIGH (ref 3.8–10.5)

## 2022-07-14 PROCEDURE — 99214 OFFICE O/P EST MOD 30 MIN: CPT

## 2022-07-14 RX ORDER — DEXAMETHASONE 4 MG/1
4 TABLET ORAL
Qty: 12 | Refills: 0 | Status: DISCONTINUED | COMMUNITY
Start: 2022-05-17 | End: 2022-07-14

## 2022-07-14 RX ORDER — APREPITANT 80 MG/1
80 CAPSULE ORAL
Qty: 2 | Refills: 0 | Status: DISCONTINUED | COMMUNITY
Start: 2022-05-17 | End: 2022-07-14

## 2022-07-19 ENCOUNTER — OUTPATIENT (OUTPATIENT)
Dept: OUTPATIENT SERVICES | Facility: HOSPITAL | Age: 47
LOS: 1 days | Discharge: HOME | End: 2022-07-19

## 2022-07-19 ENCOUNTER — NON-APPOINTMENT (OUTPATIENT)
Age: 47
End: 2022-07-19

## 2022-07-19 DIAGNOSIS — Z90.11 ACQUIRED ABSENCE OF RIGHT BREAST AND NIPPLE: Chronic | ICD-10-CM

## 2022-07-19 DIAGNOSIS — Z90.12 ACQUIRED ABSENCE OF LEFT BREAST AND NIPPLE: Chronic | ICD-10-CM

## 2022-07-19 DIAGNOSIS — K08.409 PARTIAL LOSS OF TEETH, UNSPECIFIED CAUSE, UNSPECIFIED CLASS: Chronic | ICD-10-CM

## 2022-07-19 DIAGNOSIS — Z98.890 OTHER SPECIFIED POSTPROCEDURAL STATES: Chronic | ICD-10-CM

## 2022-07-19 DIAGNOSIS — C50.919 MALIGNANT NEOPLASM OF UNSPECIFIED SITE OF UNSPECIFIED FEMALE BREAST: ICD-10-CM

## 2022-07-22 NOTE — PHYSICAL EXAM
[Fully active, able to carry on all pre-disease performance without restriction] : Status 0 - Fully active, able to carry on all pre-disease performance without restriction [Normal] : affect appropriate [de-identified] : s/p Bilateral mastectomies with reconstruction and well healing incisions

## 2022-07-22 NOTE — HISTORY OF PRESENT ILLNESS
[Disease: _____________________] : Disease: [unfilled] [T: ___] : T[unfilled] [N: ___] : N[unfilled] [M: ___] : M[unfilled] [AJCC Stage: ____] : AJCC Stage: [unfilled] [de-identified] : Left breast cancer diagnosed at the age of 46 \par 1/22 The patient noted a mass in her left breast\par 01/11/22 Mammogram and US revealed an indeterminate 2.1 cm mass in the left breast 1 o'clock axis 10 cm FN. TREVON in right breast.\par 01/13/22 Left breast 1 o'clock axis US guided core biopsy: a 0.8 cm IDC, SBR 6/9, ER >90%, [place] 73%, HER-2 negative, Ki-67 90% and P53 5%\par 01/25/22 [facility] Next panel of 36 gene negative for pathogenic variants\par 02/01/22 MRI of the breast revealed focal non mass enhancement in the right breast 9 o'clock axis 9 cm FN for which MRI guided biopsy was recommended. Enhancing mass in the right breast 7 o'clock axis 6 cm FN, for which MRI guided core biopsy  was recommended. Biopsy proven malignancy in the left breast 1 o'clock axis for which surgical and oncologic management is recommended. Questionably prominent left axillary LN, for which targeted US is recommended. \par 02/11/22 Oncotype DX breast recurrence score = 51 with predicted risk of distant recurrence at 9 years of >39% with Tamoxifen alone. Absolute chemotherapy benefit of >15%\par 02/17/22 Left axillary US showed normal left axillary lymph nodes.\par 03/14/22 Bilateral mastectomies by Dr. Lissa Mederos and AMINA flap reconstruction with Dr. Ramirez and Dr. Nas Millan\par Left mastectomy: 2.1 x 1.8 x 1.5 cm IDC, SBR 9/9 with 0/6 SLN involved and LVI identified\par Right mastectomy: Fibrocystic mastopathy, proliferative, with florid ductal hyperplasia and fibroadenomatoid change. Focal PASH with 0/2 LN\par 5/18/22 Adjuvant chemotherapy with Adriamycin (60 mg/m2) & Cytoxan (600 mg/m2) with Neulasta x 4 cycles to be followed by Taxol (175 mg/m2) x 4 cycles, all every 2 weeks\par 6/16/22 DVT with occlusive thrombus within the right internal jugular and brachiocephalic veins and nonocclusive thrombus with the right subclavian vein associated with mediport. Xarelto started\par \par  [de-identified] : 2.1 cm IDC, SBR 9/9, +LVI, 0/6 SLN, ER >90%, [place] 73%, HER-2 negative, Ki-67 90%, P53 5%, Oncotype = 51 (RR 39%) [de-identified] : Zarina presents to the office accompanied by her mother for an evaluation s/p completing 4 cycles of AC with Onpro. \par She is here today for dose dense Taxol cycle # 1 and Lupron injection\par She overall tolerated the 4th cycle of AC fairly well aside from nausea, altered taste buds and fatigue.\par She has learned to manage the nausea by taking the Reglan at the first sign of symptom and has alfred candy. No heartburn. Her taste buds are affected and lots of things taste bad. The fatigue lasted longer and was a little worse with this last cycle\par She continues to take Xarelto for  occlusive thrombus within the right internal jugular,  brachiocephalic veins and nonocclusive thrombus within the right subclavian vein. \par She is no longer experiencing any pain or discomfort in  her neck. Rx for Xarelto maintenance dose was provided \par She is  with two children ages 6 and 10. She is a [employer]  and currently on medical leave.\par \par HEALTH MAINTENANCE:\par PCP: Krys Ayala MD\par GYN: Pallavi Angel MD\par Mammogram: s/p bilateral mastectomies\par Pap smear:  negative\par Colonoscopy: none \par Genetic testin22 [facility] Next gene panel with 36 genes negative for pathogenic variants\par

## 2022-07-27 ENCOUNTER — RESULT REVIEW (OUTPATIENT)
Age: 47
End: 2022-07-27

## 2022-07-27 ENCOUNTER — APPOINTMENT (OUTPATIENT)
Dept: INFUSION THERAPY | Facility: HOSPITAL | Age: 47
End: 2022-07-27

## 2022-07-27 LAB
ALBUMIN SERPL ELPH-MCNC: 4.3 G/DL — SIGNIFICANT CHANGE UP (ref 3.3–5)
ALP SERPL-CCNC: 79 U/L — SIGNIFICANT CHANGE UP (ref 40–120)
ALT FLD-CCNC: 32 U/L — SIGNIFICANT CHANGE UP (ref 10–45)
ANION GAP SERPL CALC-SCNC: 12 MMOL/L — SIGNIFICANT CHANGE UP (ref 5–17)
AST SERPL-CCNC: 24 U/L — SIGNIFICANT CHANGE UP (ref 10–40)
BASOPHILS # BLD AUTO: 0.08 K/UL — SIGNIFICANT CHANGE UP (ref 0–0.2)
BASOPHILS NFR BLD AUTO: 2.7 % — HIGH (ref 0–2)
BILIRUB SERPL-MCNC: 0.2 MG/DL — SIGNIFICANT CHANGE UP (ref 0.2–1.2)
BUN SERPL-MCNC: 7 MG/DL — SIGNIFICANT CHANGE UP (ref 7–23)
CALCIUM SERPL-MCNC: 9 MG/DL — SIGNIFICANT CHANGE UP (ref 8.4–10.5)
CHLORIDE SERPL-SCNC: 104 MMOL/L — SIGNIFICANT CHANGE UP (ref 96–108)
CO2 SERPL-SCNC: 22 MMOL/L — SIGNIFICANT CHANGE UP (ref 22–31)
CREAT SERPL-MCNC: 0.72 MG/DL — SIGNIFICANT CHANGE UP (ref 0.5–1.3)
EGFR: 105 ML/MIN/1.73M2 — SIGNIFICANT CHANGE UP
EOSINOPHIL # BLD AUTO: 0.09 K/UL — SIGNIFICANT CHANGE UP (ref 0–0.5)
EOSINOPHIL NFR BLD AUTO: 3 % — SIGNIFICANT CHANGE UP (ref 0–6)
GLUCOSE SERPL-MCNC: 78 MG/DL — SIGNIFICANT CHANGE UP (ref 70–99)
HCT VFR BLD CALC: 31 % — LOW (ref 34.5–45)
HGB BLD-MCNC: 10.8 G/DL — LOW (ref 11.5–15.5)
IMM GRANULOCYTES NFR BLD AUTO: 0.3 % — SIGNIFICANT CHANGE UP (ref 0–1.5)
LYMPHOCYTES # BLD AUTO: 1.03 K/UL — SIGNIFICANT CHANGE UP (ref 1–3.3)
LYMPHOCYTES # BLD AUTO: 34.7 % — SIGNIFICANT CHANGE UP (ref 13–44)
MCHC RBC-ENTMCNC: 32.2 PG — SIGNIFICANT CHANGE UP (ref 27–34)
MCHC RBC-ENTMCNC: 34.8 G/DL — SIGNIFICANT CHANGE UP (ref 32–36)
MCV RBC AUTO: 92.5 FL — SIGNIFICANT CHANGE UP (ref 80–100)
MONOCYTES # BLD AUTO: 0.71 K/UL — SIGNIFICANT CHANGE UP (ref 0–0.9)
MONOCYTES NFR BLD AUTO: 23.9 % — HIGH (ref 2–14)
NEUTROPHILS # BLD AUTO: 1.05 K/UL — LOW (ref 1.8–7.4)
NEUTROPHILS NFR BLD AUTO: 35.4 % — LOW (ref 43–77)
NRBC # BLD: 0 /100 WBCS — SIGNIFICANT CHANGE UP (ref 0–0)
PLATELET # BLD AUTO: 375 K/UL — SIGNIFICANT CHANGE UP (ref 150–400)
POTASSIUM SERPL-MCNC: 4 MMOL/L — SIGNIFICANT CHANGE UP (ref 3.5–5.3)
POTASSIUM SERPL-SCNC: 4 MMOL/L — SIGNIFICANT CHANGE UP (ref 3.5–5.3)
PROT SERPL-MCNC: 6.5 G/DL — SIGNIFICANT CHANGE UP (ref 6–8.3)
RBC # BLD: 3.35 M/UL — LOW (ref 3.8–5.2)
RBC # FLD: 15.7 % — HIGH (ref 10.3–14.5)
SODIUM SERPL-SCNC: 138 MMOL/L — SIGNIFICANT CHANGE UP (ref 135–145)
WBC # BLD: 2.97 K/UL — LOW (ref 3.8–10.5)
WBC # FLD AUTO: 2.97 K/UL — LOW (ref 3.8–10.5)

## 2022-07-29 ENCOUNTER — APPOINTMENT (OUTPATIENT)
Dept: HEMATOLOGY ONCOLOGY | Facility: CLINIC | Age: 47
End: 2022-07-29

## 2022-07-29 ENCOUNTER — RESULT REVIEW (OUTPATIENT)
Age: 47
End: 2022-07-29

## 2022-07-29 ENCOUNTER — APPOINTMENT (OUTPATIENT)
Dept: INFUSION THERAPY | Facility: HOSPITAL | Age: 47
End: 2022-07-29

## 2022-07-29 VITALS
WEIGHT: 198.83 LBS | RESPIRATION RATE: 16 BRPM | HEART RATE: 73 BPM | OXYGEN SATURATION: 99 % | TEMPERATURE: 97.3 F | SYSTOLIC BLOOD PRESSURE: 131 MMHG | DIASTOLIC BLOOD PRESSURE: 91 MMHG | BODY MASS INDEX: 30.24 KG/M2

## 2022-07-29 DIAGNOSIS — R11.0 NAUSEA: ICD-10-CM

## 2022-07-29 DIAGNOSIS — T45.1X5A NAUSEA: ICD-10-CM

## 2022-07-29 LAB
ANISOCYTOSIS BLD QL: SLIGHT — SIGNIFICANT CHANGE UP
BASOPHILS # BLD AUTO: 0.04 K/UL — SIGNIFICANT CHANGE UP (ref 0–0.2)
BASOPHILS NFR BLD AUTO: 1 % — SIGNIFICANT CHANGE UP (ref 0–2)
DACRYOCYTES BLD QL SMEAR: SLIGHT — SIGNIFICANT CHANGE UP
ELLIPTOCYTES BLD QL SMEAR: SLIGHT — SIGNIFICANT CHANGE UP
EOSINOPHIL # BLD AUTO: 0 K/UL — SIGNIFICANT CHANGE UP (ref 0–0.5)
EOSINOPHIL NFR BLD AUTO: 0 % — SIGNIFICANT CHANGE UP (ref 0–6)
HCT VFR BLD CALC: 32.4 % — LOW (ref 34.5–45)
HGB BLD-MCNC: 11.3 G/DL — LOW (ref 11.5–15.5)
LYMPHOCYTES # BLD AUTO: 1.16 K/UL — SIGNIFICANT CHANGE UP (ref 1–3.3)
LYMPHOCYTES # BLD AUTO: 32 % — SIGNIFICANT CHANGE UP (ref 13–44)
MCHC RBC-ENTMCNC: 32.2 PG — SIGNIFICANT CHANGE UP (ref 27–34)
MCHC RBC-ENTMCNC: 34.9 G/DL — SIGNIFICANT CHANGE UP (ref 32–36)
MCV RBC AUTO: 92.3 FL — SIGNIFICANT CHANGE UP (ref 80–100)
MONOCYTES # BLD AUTO: 0.76 K/UL — SIGNIFICANT CHANGE UP (ref 0–0.9)
MONOCYTES NFR BLD AUTO: 21 % — HIGH (ref 2–14)
NEUTROPHILS # BLD AUTO: 1.67 K/UL — LOW (ref 1.8–7.4)
NEUTROPHILS NFR BLD AUTO: 46 % — SIGNIFICANT CHANGE UP (ref 43–77)
NRBC # BLD: 0 /100 — SIGNIFICANT CHANGE UP (ref 0–0)
NRBC # BLD: SIGNIFICANT CHANGE UP /100 WBCS (ref 0–0)
PLAT MORPH BLD: NORMAL — SIGNIFICANT CHANGE UP
PLATELET # BLD AUTO: 322 K/UL — SIGNIFICANT CHANGE UP (ref 150–400)
POIKILOCYTOSIS BLD QL AUTO: SLIGHT — SIGNIFICANT CHANGE UP
POLYCHROMASIA BLD QL SMEAR: SLIGHT — SIGNIFICANT CHANGE UP
RBC # BLD: 3.51 M/UL — LOW (ref 3.8–5.2)
RBC # FLD: 15.5 % — HIGH (ref 10.3–14.5)
RBC BLD AUTO: ABNORMAL
WBC # BLD: 3.64 K/UL — LOW (ref 3.8–10.5)
WBC # FLD AUTO: 3.64 K/UL — LOW (ref 3.8–10.5)

## 2022-07-29 PROCEDURE — 99214 OFFICE O/P EST MOD 30 MIN: CPT

## 2022-07-29 NOTE — PHYSICAL EXAM
[Fully active, able to carry on all pre-disease performance without restriction] : Status 0 - Fully active, able to carry on all pre-disease performance without restriction [Normal] : affect appropriate [de-identified] : s/p Bilateral mastectomies with AMINA flap reconstruction

## 2022-07-29 NOTE — HISTORY OF PRESENT ILLNESS
[Disease: _____________________] : Disease: [unfilled] [T: ___] : T[unfilled] [N: ___] : N[unfilled] [M: ___] : M[unfilled] [AJCC Stage: ____] : AJCC Stage: [unfilled] [de-identified] : Left breast cancer diagnosed at the age of 46 \par 1/22 The patient noted a mass in her left breast\par 01/11/22 Mammogram and US revealed an indeterminate 2.1 cm mass in the left breast 1 o'clock axis 10 cm FN. TREVON in right breast.\par 01/13/22 Left breast 1 o'clock axis US guided core biopsy: a 0.8 cm IDC, SBR 6/9, ER >90%, [place] 73%, HER-2 negative, Ki-67 90% and P53 5%\par 01/25/22 [facility] [employer] [facility] Next panel of 36 gene negative for pathogenic variants\par 02/01/22 MRI of the breast revealed focal non mass enhancement in the right breast 9 o'clock axis 9 cm FN for which MRI guided biopsy was recommended. Enhancing mass in the right breast 7 o'clock axis 6 cm FN, for which MRI guided core biopsy  was recommended. Biopsy proven malignancy in the left breast 1 o'clock axis for which surgical and oncologic management is recommended. Questionably prominent left axillary LN, for which targeted US is recommended. \par 02/11/22 Oncotype DX breast recurrence score = 51 with predicted risk of distant recurrence at 9 years of >39% with Tamoxifen alone. Absolute chemotherapy benefit of >15%\par 02/17/22 Left axillary US showed normal left axillary lymph nodes.\par 03/14/22 Bilateral mastectomies by Dr. Lissa Mederos and AMINA flap reconstruction with Dr. Ramirez and Dr. Nas Millan\par     Left mastectomy: 2.1 x 1.8 x 1.5 cm IDC, SBR 9/9 with 0/6 SLN involved and LVI identified\par     Right mastectomy: Fibrocystic mastopathy, proliferative, with florid ductal hyperplasia and fibroadenomatoid change. Focal PASH with 0/2 LN\par 5/18/22 - 7/1/22 Adjuvant chemotherapy with Adriamycin (60 mg/m2) & Cytoxan (600 mg/m2) with Neulasta every 2 weeks x 4 cycles \par 7/14/22 - 8/26/22 Taxol (175 mg/m2) every 2 weeks x 4 cycles\par 6/16/22 DVT with occlusive thrombus within the right internal jugular and brachiocephalic veins and nonocclusive thrombus with the right subclavian vein associated with mediport. Xarelto started\par 7/14/22 Lupron started\par \par  [de-identified] : 2.1 cm IDC, SBR 9/9, +LVI, 0/6 SLN, ER >90%, [place] 73%, HER-2 negative, Ki-67 90%, P53 5%, Oncotype = 51 (RR 39%) [de-identified] : Zarina started adjuvant chemotherapy on  and is getting C2 Taxol today. She tolerated C1 well aside from experiencing significant joint and muscle pain on days 2-4 which was not relieved with Tylenol and prevented her from sleeping. She used cold therapy for her hands and feet during the chemotherapy and experienced mild numbness in her fingers the first day which then resolved.\par Less fatigue than with the AC and no nausea. \par ANC from 2 days ago 1.05, repeat CBC today shows ANC 1.5\par She continues to take Xarelto for occlusive thrombus within the right internal jugular,  brachiocephalic veins and nonocclusive thrombus within the right subclavian vein. \par She is no longer experiencing any pain or discomfort in  her neck. \par She is  with two children ages 6 and 10. She is a [employer]  and currently on medical leave.\par \par HEALTH MAINTENANCE:\par PCP: Krys Ayala MD\par GYN: Pallavi Angel MD\par Mammogram: s/p bilateral mastectomies\par Pap smear:  negative\par Colonoscopy: none \par Genetic testin22 [facility] Next gene panel with 36 genes negative for pathogenic variants\par

## 2022-08-01 DIAGNOSIS — Z51.11 ENCOUNTER FOR ANTINEOPLASTIC CHEMOTHERAPY: ICD-10-CM

## 2022-08-01 DIAGNOSIS — R11.2 NAUSEA WITH VOMITING, UNSPECIFIED: ICD-10-CM

## 2022-08-09 ENCOUNTER — RESULT REVIEW (OUTPATIENT)
Age: 47
End: 2022-08-09

## 2022-08-09 ENCOUNTER — APPOINTMENT (OUTPATIENT)
Dept: INFUSION THERAPY | Facility: HOSPITAL | Age: 47
End: 2022-08-09

## 2022-08-09 ENCOUNTER — APPOINTMENT (OUTPATIENT)
Dept: HEMATOLOGY ONCOLOGY | Facility: CLINIC | Age: 47
End: 2022-08-09

## 2022-08-09 ENCOUNTER — APPOINTMENT (OUTPATIENT)
Dept: BREAST CENTER | Facility: CLINIC | Age: 47
End: 2022-08-09

## 2022-08-09 VITALS
HEIGHT: 67 IN | WEIGHT: 198 LBS | SYSTOLIC BLOOD PRESSURE: 122 MMHG | HEART RATE: 84 BPM | DIASTOLIC BLOOD PRESSURE: 80 MMHG | BODY MASS INDEX: 31.08 KG/M2

## 2022-08-09 VITALS
OXYGEN SATURATION: 99 % | DIASTOLIC BLOOD PRESSURE: 82 MMHG | SYSTOLIC BLOOD PRESSURE: 115 MMHG | WEIGHT: 198.19 LBS | HEART RATE: 81 BPM | RESPIRATION RATE: 16 BRPM | TEMPERATURE: 97.1 F | BODY MASS INDEX: 30.14 KG/M2

## 2022-08-09 DIAGNOSIS — R92.8 OTHER ABNORMAL AND INCONCLUSIVE FINDINGS ON DIAGNOSTIC IMAGING OF BREAST: ICD-10-CM

## 2022-08-09 LAB
ALBUMIN SERPL ELPH-MCNC: 4.4 G/DL — SIGNIFICANT CHANGE UP (ref 3.3–5)
ALP SERPL-CCNC: 84 U/L — SIGNIFICANT CHANGE UP (ref 40–120)
ALT FLD-CCNC: 24 U/L — SIGNIFICANT CHANGE UP (ref 10–45)
ANION GAP SERPL CALC-SCNC: 12 MMOL/L — SIGNIFICANT CHANGE UP (ref 5–17)
AST SERPL-CCNC: 17 U/L — SIGNIFICANT CHANGE UP (ref 10–40)
BASOPHILS # BLD AUTO: 0.06 K/UL — SIGNIFICANT CHANGE UP (ref 0–0.2)
BASOPHILS NFR BLD AUTO: 1.3 % — SIGNIFICANT CHANGE UP (ref 0–2)
BILIRUB SERPL-MCNC: 0.2 MG/DL — SIGNIFICANT CHANGE UP (ref 0.2–1.2)
BUN SERPL-MCNC: 16 MG/DL — SIGNIFICANT CHANGE UP (ref 7–23)
CALCIUM SERPL-MCNC: 9.6 MG/DL — SIGNIFICANT CHANGE UP (ref 8.4–10.5)
CHLORIDE SERPL-SCNC: 107 MMOL/L — SIGNIFICANT CHANGE UP (ref 96–108)
CO2 SERPL-SCNC: 20 MMOL/L — LOW (ref 22–31)
CREAT SERPL-MCNC: 0.8 MG/DL — SIGNIFICANT CHANGE UP (ref 0.5–1.3)
EGFR: 93 ML/MIN/1.73M2 — SIGNIFICANT CHANGE UP
EOSINOPHIL # BLD AUTO: 0.21 K/UL — SIGNIFICANT CHANGE UP (ref 0–0.5)
EOSINOPHIL NFR BLD AUTO: 4.7 % — SIGNIFICANT CHANGE UP (ref 0–6)
GLUCOSE SERPL-MCNC: 99 MG/DL — SIGNIFICANT CHANGE UP (ref 70–99)
HCT VFR BLD CALC: 32.2 % — LOW (ref 34.5–45)
HGB BLD-MCNC: 11 G/DL — LOW (ref 11.5–15.5)
IMM GRANULOCYTES NFR BLD AUTO: 0.2 % — SIGNIFICANT CHANGE UP (ref 0–1.5)
LYMPHOCYTES # BLD AUTO: 1.18 K/UL — SIGNIFICANT CHANGE UP (ref 1–3.3)
LYMPHOCYTES # BLD AUTO: 26.3 % — SIGNIFICANT CHANGE UP (ref 13–44)
MCHC RBC-ENTMCNC: 31.9 PG — SIGNIFICANT CHANGE UP (ref 27–34)
MCHC RBC-ENTMCNC: 34.2 G/DL — SIGNIFICANT CHANGE UP (ref 32–36)
MCV RBC AUTO: 93.3 FL — SIGNIFICANT CHANGE UP (ref 80–100)
MONOCYTES # BLD AUTO: 0.42 K/UL — SIGNIFICANT CHANGE UP (ref 0–0.9)
MONOCYTES NFR BLD AUTO: 9.4 % — SIGNIFICANT CHANGE UP (ref 2–14)
NEUTROPHILS # BLD AUTO: 2.61 K/UL — SIGNIFICANT CHANGE UP (ref 1.8–7.4)
NEUTROPHILS NFR BLD AUTO: 58.1 % — SIGNIFICANT CHANGE UP (ref 43–77)
NRBC # BLD: 0 /100 WBCS — SIGNIFICANT CHANGE UP (ref 0–0)
PLATELET # BLD AUTO: 416 K/UL — HIGH (ref 150–400)
POTASSIUM SERPL-MCNC: 4.3 MMOL/L — SIGNIFICANT CHANGE UP (ref 3.5–5.3)
POTASSIUM SERPL-SCNC: 4.3 MMOL/L — SIGNIFICANT CHANGE UP (ref 3.5–5.3)
PROT SERPL-MCNC: 6.8 G/DL — SIGNIFICANT CHANGE UP (ref 6–8.3)
RBC # BLD: 3.45 M/UL — LOW (ref 3.8–5.2)
RBC # FLD: 14.8 % — HIGH (ref 10.3–14.5)
SODIUM SERPL-SCNC: 140 MMOL/L — SIGNIFICANT CHANGE UP (ref 135–145)
WBC # BLD: 4.49 K/UL — SIGNIFICANT CHANGE UP (ref 3.8–10.5)
WBC # FLD AUTO: 4.49 K/UL — SIGNIFICANT CHANGE UP (ref 3.8–10.5)

## 2022-08-09 PROCEDURE — 99214 OFFICE O/P EST MOD 30 MIN: CPT

## 2022-08-09 RX ORDER — RIVAROXABAN 15 MG-20MG
15 & 20 KIT ORAL
Qty: 1 | Refills: 0 | Status: DISCONTINUED | COMMUNITY
Start: 2022-06-17 | End: 2022-08-09

## 2022-08-09 RX ORDER — NYSTATIN 100000 U/G
100000 OINTMENT TOPICAL 3 TIMES DAILY
Qty: 1 | Refills: 0 | Status: DISCONTINUED | COMMUNITY
Start: 2022-03-30 | End: 2022-08-09

## 2022-08-09 NOTE — DATA REVIEWED
[FreeTextEntry1] : I have independently reviewed the reports and the images. \par \par B/l mammogram and US 1/11/22\par - heterogenously dense\par - 2 cm mass at area of concern in L UOQ; correlates to a 1.8 x 1.3 x 2.1 cm nodule at 1:00 N10; US bx\par - benign R intramammary node\par - R breast cyst\par - no findings in L axilla\par - BIRADS 4\par \par US needle bx 1/13/22\par - L breast 1:00, q clip = infiltrating ductal carcinoma , 6/9, ER 90%, [place] 73%, Her2 0\par \par Breast MRI 2/1/22\par - 3 cm nonmass enhancement in R breast 9:00 N9; MR bx\par - 0.6 cm enhancing mass in R breast 7:00 N6; MR bx\par - 2 x 2 x 3 cm enhancement in L breast 1:00 N9 at biopsied cancer\par - 0.3 cm enhancement in L breast skin at 3:00 N9; clinical exam\par - questionable L axillary nodes; L axillary  US\par \par L limited US 2/17/22\par - normal L axillary nodes\par \par Surgical pathology 3/14/22\par - R mastectomy is negative; 0/2 R sln\par - L mastectomy = 2.1 cm tumor; 0/2 L sln\par \par Oncotype test (sent from biopsy, report 2/11/22)\par - Oncotype score = 51

## 2022-08-09 NOTE — HISTORY OF PRESENT ILLNESS
[FreeTextEntry1] : Ms. Colby is a 47 year old woman here for a follow-up for a left infiltrating ductal carcinoma, pathologic stage II, pT2 pN0. . She is s/p b/l mastectomy, sentinel node biopsy, AMINA reconstruction (3/14/22). Her Oncotype score is 51, and she is on the taxol portion of adjuvant chemotherapy (ddAC-T, Dr. Forde) with the last dose scheduled for the end of August. She had a DVT in the neck and is currently on Xarelto. She is now on lupron. She tolerating treatment well. \par \par Her genetic panel testing is negative. Her family history is not significant for any breast cancer.

## 2022-08-09 NOTE — PHYSICAL EXAM
[Normocephalic] : normocephalic [Sclera nonicteric] : sclera nonicteric [Supple] : supple [No Supraclavicular Adenopathy] : no supraclavicular adenopathy [No Cervical Adenopathy] : no cervical adenopathy [Clear to Auscultation Bilat] : clear to auscultation bilaterally [Normal Sinus Rhythm] : normal sinus rhythm [Examined in the supine and seated position] : examined in the supine and seated position [No dominant masses] : no dominant masses in right breast  [No dominant masses] : no dominant masses left breast [No Axillary Lymphadenopathy] : no left axillary lymphadenopathy [Soft] : abdomen soft [No Edema] : no edema [No Rashes] : no rashes [No Ulceration] : no ulceration [de-identified] : Circumareolar scar with vertical extension. AMINA flap.  [de-identified] : Circumareolar scar with vertical extension. AMINA flap  [de-identified] : Transverse scar

## 2022-08-10 ENCOUNTER — NON-APPOINTMENT (OUTPATIENT)
Age: 47
End: 2022-08-10

## 2022-08-10 NOTE — DISCUSSION/SUMMARY
[Cancer Type / Location / Histology Subtype: ________] : Cancer Type / Location / Histology Subtype: [unfilled] [Diagnosis Date (year): ____] : Diagnosis Date (year): [unfilled] [II] : II [Surgery] : Surgery: Yes [Surgery Date(s) (year): ____] : Surgery Date(s) (year): [unfilled] [Surgical Procedure / Location / Findings: _________] : Surgical Procedure / Location / Findings: [unfilled] [Systemic Therapy (chemotherapy, hormonal therapy, other)] : Systemic Therapy (chemotherapy, hormonal therapy, other): Yes [Genetic / hereditary risk factor(s) or predisposing conditions: __________] : Genetic / hereditary risk factor(s) or predisposing conditions: [unfilled] [Need for onging (adjuvant) treatment for cancer?] : Need for onging (adjuvant) treatment for cancer? Yes [Primary care physician] : primary care physician [Colonoscopy] : colonoscopy [Skin Checks] : skin checks [PAP Test] : PAP test [Bone Density Test] : bone density test [Cholesterol Test] : cholesterol test [Annual Flu Shot] : annual flu shot [Cardiac Toxicity] : cardiac toxicity [Anxiety and Depression] : anxiety and depression [Cognitive Function] : cognitive function [Sexual Function] : sexual function [Sleep Disorders] : sleep disorders [Emotional and mental health] : Emotional and mental health [Memory or Concentration Loss] : Memory or concentration loss [Fatigue] : Fatigue [Fertility] : Fertility [Sexual Functioning] : Sexual functioning [Alcohol use] : Alcohol use [Weigh Management (loss / gain)] : Weight management (loss / gain) [Diet] : Diet [Sun screen use] : Sun screen use [Physical activity] : Physical activity [Path to Wellness Survivorship Program] : Path to Wellness Survivorship Program [Psycho-social Emotional Support ([place])] : Psycho-social Emotional Support  (please call SW at [phone]) [Nutritional and Wellness Counseling ([place])] : Nutritional and Wellness Counseling (please call Nutrition office at [phone]) [[facility] Breast Cancer Hotline] : [facility] Breast Cancer Hotline [Cancer Care] : Cancer Care [American Cancer Society] : the American Cancer Society [Other: ______] : [unfilled] [Radiation] : Radiation: No [Genetic Counseling] : Genetic Counseling: No [FreeTextEntry1] : Adjuvant dose dense AC-T:\par adriamycin / cytoxan every 2 weeks x 4 cycles  5/18/2022  -  7/1/2022\par taxol every 2 weeks x 4 cycles  7/14/2022  -  8/26/2022\par cumulative dose of adriamycin = 480mg/240mg/m2 [FreeTextEntry2] : lupron injection  monthly for ovarian suppression initiated 7/14/2022\par exemestane to follow for risk reduction therapy [FreeTextEntry7] : Zarina developed a deep vein thrombosis in internal jugular vein during treatment secondary\par to mediport. Placed on xarelto. [FreeTextEntry8] : DEE Gallo

## 2022-08-11 ENCOUNTER — APPOINTMENT (OUTPATIENT)
Dept: HEMATOLOGY ONCOLOGY | Facility: CLINIC | Age: 47
End: 2022-08-11

## 2022-08-11 ENCOUNTER — APPOINTMENT (OUTPATIENT)
Dept: INFUSION THERAPY | Facility: HOSPITAL | Age: 47
End: 2022-08-11

## 2022-08-11 ENCOUNTER — NON-APPOINTMENT (OUTPATIENT)
Age: 47
End: 2022-08-11

## 2022-08-15 NOTE — HISTORY OF PRESENT ILLNESS
[Disease: _____________________] : Disease: [unfilled] [T: ___] : T[unfilled] [N: ___] : N[unfilled] [M: ___] : M[unfilled] [AJCC Stage: ____] : AJCC Stage: [unfilled] [de-identified] : Left breast cancer diagnosed at the age of 46 \par 1/22 The patient noted a mass in her left breast\par 01/11/22 Mammogram and US revealed an indeterminate 2.1 cm mass in the left breast 1 o'clock axis 10 cm FN. TREVON in right breast.\par 01/13/22 Left breast 1 o'clock axis US guided core biopsy: a 0.8 cm IDC, SBR 6/9, ER >90%, [place] 73%, HER-2 negative, Ki-67 90% and P53 5%\par 01/25/22 [facility] [employer] [facility] Next panel of 36 gene negative for pathogenic variants\par 02/01/22 MRI of the breast revealed focal non mass enhancement in the right breast 9 o'clock axis 9 cm FN for which MRI guided biopsy was recommended. Enhancing mass in the right breast 7 o'clock axis 6 cm FN, for which MRI guided core biopsy  was recommended. Biopsy proven malignancy in the left breast 1 o'clock axis for which surgical and oncologic management is recommended. Questionably prominent left axillary LN, for which targeted US is recommended. \par 02/11/22 Oncotype DX breast recurrence score = 51 with predicted risk of distant recurrence at 9 years of >39% with Tamoxifen alone. Absolute chemotherapy benefit of >15%\par 02/17/22 Left axillary US showed normal left axillary lymph nodes.\par 03/14/22 Bilateral mastectomies by Dr. Lissa Mederos and AMINA flap reconstruction with Dr. Ramirez and Dr. Nas Millan\par     Left mastectomy: 2.1 x 1.8 x 1.5 cm IDC, SBR 9/9 with 0/6 SLN involved and LVI identified\par     Right mastectomy: Fibrocystic mastopathy, proliferative, with florid ductal hyperplasia and fibroadenomatoid change. Focal PASH with 0/2 LN\par 5/18/22 - 7/1/22 Adjuvant chemotherapy with Adriamycin (60 mg/m2) & Cytoxan (600 mg/m2) with Neulasta every 2 weeks x 4 cycles \par 7/14/22 - 8/26/22 Taxol (175 mg/m2) every 2 weeks x 4 cycles\par 6/16/22 DVT with occlusive thrombus within the right internal jugular and brachiocephalic veins and nonocclusive thrombus with the right subclavian vein associated with mediport. Xarelto started\par 7/14/22 Lupron started\par \par  [de-identified] : 2.1 cm IDC, SBR 9/9, +LVI, 0/6 SLN, ER >90%, [place] 73%, HER-2 negative, Ki-67 90%, P53 5%, Oncotype = 51 (RR 39%) [de-identified] : Zarina started adjuvant chemotherapy on  and is getting C3 Taxol with Lupron on 22.\par She overall tolerated C2 fairly well aside from experiencing significant joint and muscle pain in on days 2-4 post treatment. She was prescribed Tramadol, which definitively helped in managing her pain. She reports taking the Tramadol for 3 days, the days when the pain is at it's worse, the rest of the time she is able to manage the mild aches and pain. She c/o an episode of palpitations and tachycardia last week which she attributes to being in the heat for to long ( cooking in the kitchen with a broken AC at home). She has not had another episode since. She also c/o feeling more hot than before, likely hot flashes from chemotherapy/Lupron.\par She goes through periods of constipation followed by diarrhea, usually after intake of Dulcolax as needed.\par She denies any fever, chills, night sweats, CP,SOB, N/V, neuropathy or mucositis.\par She used cold therapy for her hands and feet during the chemotherapy and experienced mild numbness in her fingers the first day which then resolved.\par Less fatigue than with the AC and no nausea. \par She continues to take Xarelto for occlusive thrombus within the right internal jugular,  brachiocephalic veins and nonocclusive thrombus within the right subclavian vein. \par She is no longer experiencing any pain or discomfort in  her neck. \par She is  with two children ages 6 and 10. She is a HS  and currently on medical leave.\par \par HEALTH MAINTENANCE:\par PCP: Krys Ayala MD\par GYN: Pallavi Angel MD\par Mammogram: s/p bilateral mastectomies\par Pap smear:  negative\par Colonoscopy: none \par Genetic testin22 [facility] Next gene panel with 36 genes negative for pathogenic variants\par

## 2022-08-15 NOTE — PHYSICAL EXAM
[Fully active, able to carry on all pre-disease performance without restriction] : Status 0 - Fully active, able to carry on all pre-disease performance without restriction [Normal] : affect appropriate [de-identified] : s/p Bilateral mastectomies with AMINA flap reconstruction

## 2022-08-24 ENCOUNTER — RESULT REVIEW (OUTPATIENT)
Age: 47
End: 2022-08-24

## 2022-08-24 ENCOUNTER — APPOINTMENT (OUTPATIENT)
Dept: HEMATOLOGY ONCOLOGY | Facility: CLINIC | Age: 47
End: 2022-08-24

## 2022-08-24 ENCOUNTER — APPOINTMENT (OUTPATIENT)
Dept: INFUSION THERAPY | Facility: HOSPITAL | Age: 47
End: 2022-08-24

## 2022-08-24 LAB
ALBUMIN SERPL ELPH-MCNC: 4.5 G/DL
ALP BLD-CCNC: 87 U/L
ALT SERPL-CCNC: 20 U/L
ANION GAP SERPL CALC-SCNC: 14 MMOL/L
AST SERPL-CCNC: 16 U/L
BASOPHILS # BLD AUTO: 0.04 K/UL — SIGNIFICANT CHANGE UP (ref 0–0.2)
BASOPHILS NFR BLD AUTO: 1 % — SIGNIFICANT CHANGE UP (ref 0–2)
BILIRUB SERPL-MCNC: 0.3 MG/DL
BUN SERPL-MCNC: 13 MG/DL
CALCIUM SERPL-MCNC: 9.6 MG/DL
CHLORIDE SERPL-SCNC: 104 MMOL/L
CO2 SERPL-SCNC: 21 MMOL/L
CREAT SERPL-MCNC: 0.85 MG/DL
EGFR: 86 ML/MIN/1.73M2
EOSINOPHIL # BLD AUTO: 0.23 K/UL — SIGNIFICANT CHANGE UP (ref 0–0.5)
EOSINOPHIL NFR BLD AUTO: 5.7 % — SIGNIFICANT CHANGE UP (ref 0–6)
GLUCOSE SERPL-MCNC: 108 MG/DL
HCT VFR BLD CALC: 35.1 % — SIGNIFICANT CHANGE UP (ref 34.5–45)
HGB BLD-MCNC: 12.1 G/DL — SIGNIFICANT CHANGE UP (ref 11.5–15.5)
IMM GRANULOCYTES NFR BLD AUTO: 0.5 % — SIGNIFICANT CHANGE UP (ref 0–1.5)
LYMPHOCYTES # BLD AUTO: 1.14 K/UL — SIGNIFICANT CHANGE UP (ref 1–3.3)
LYMPHOCYTES # BLD AUTO: 28.1 % — SIGNIFICANT CHANGE UP (ref 13–44)
MCHC RBC-ENTMCNC: 32.1 PG — SIGNIFICANT CHANGE UP (ref 27–34)
MCHC RBC-ENTMCNC: 34.5 G/DL — SIGNIFICANT CHANGE UP (ref 32–36)
MCV RBC AUTO: 93.1 FL — SIGNIFICANT CHANGE UP (ref 80–100)
MONOCYTES # BLD AUTO: 0.43 K/UL — SIGNIFICANT CHANGE UP (ref 0–0.9)
MONOCYTES NFR BLD AUTO: 10.6 % — SIGNIFICANT CHANGE UP (ref 2–14)
NEUTROPHILS # BLD AUTO: 2.19 K/UL — SIGNIFICANT CHANGE UP (ref 1.8–7.4)
NEUTROPHILS NFR BLD AUTO: 54.1 % — SIGNIFICANT CHANGE UP (ref 43–77)
NRBC # BLD: 0 /100 WBCS — SIGNIFICANT CHANGE UP (ref 0–0)
PLATELET # BLD AUTO: 324 K/UL — SIGNIFICANT CHANGE UP (ref 150–400)
POTASSIUM SERPL-SCNC: 4 MMOL/L
PROT SERPL-MCNC: 6.8 G/DL
RBC # BLD: 3.77 M/UL — LOW (ref 3.8–5.2)
RBC # FLD: 13.6 % — SIGNIFICANT CHANGE UP (ref 10.3–14.5)
SODIUM SERPL-SCNC: 138 MMOL/L
WBC # BLD: 4.05 K/UL — SIGNIFICANT CHANGE UP (ref 3.8–10.5)
WBC # FLD AUTO: 4.05 K/UL — SIGNIFICANT CHANGE UP (ref 3.8–10.5)

## 2022-08-26 ENCOUNTER — APPOINTMENT (OUTPATIENT)
Dept: INFUSION THERAPY | Facility: HOSPITAL | Age: 47
End: 2022-08-26

## 2022-08-26 ENCOUNTER — RESULT REVIEW (OUTPATIENT)
Age: 47
End: 2022-08-26

## 2022-08-26 ENCOUNTER — APPOINTMENT (OUTPATIENT)
Dept: HEMATOLOGY ONCOLOGY | Facility: CLINIC | Age: 47
End: 2022-08-26

## 2022-08-26 VITALS
WEIGHT: 198.19 LBS | RESPIRATION RATE: 14 BRPM | OXYGEN SATURATION: 98 % | TEMPERATURE: 96.5 F | DIASTOLIC BLOOD PRESSURE: 79 MMHG | SYSTOLIC BLOOD PRESSURE: 122 MMHG | BODY MASS INDEX: 31.04 KG/M2 | HEART RATE: 68 BPM

## 2022-08-26 DIAGNOSIS — Z85.3 PERSONAL HISTORY OF MALIGNANT NEOPLASM OF BREAST: ICD-10-CM

## 2022-08-26 LAB
BASOPHILS # BLD AUTO: 0.07 K/UL — SIGNIFICANT CHANGE UP (ref 0–0.2)
BASOPHILS NFR BLD AUTO: 1.9 % — SIGNIFICANT CHANGE UP (ref 0–2)
EOSINOPHIL # BLD AUTO: 0.18 K/UL — SIGNIFICANT CHANGE UP (ref 0–0.5)
EOSINOPHIL NFR BLD AUTO: 4.8 % — SIGNIFICANT CHANGE UP (ref 0–6)
HCT VFR BLD CALC: 36.2 % — SIGNIFICANT CHANGE UP (ref 34.5–45)
HGB BLD-MCNC: 12.3 G/DL — SIGNIFICANT CHANGE UP (ref 11.5–15.5)
IMM GRANULOCYTES NFR BLD AUTO: 0.3 % — SIGNIFICANT CHANGE UP (ref 0–1.5)
LYMPHOCYTES # BLD AUTO: 1.27 K/UL — SIGNIFICANT CHANGE UP (ref 1–3.3)
LYMPHOCYTES # BLD AUTO: 33.7 % — SIGNIFICANT CHANGE UP (ref 13–44)
MCHC RBC-ENTMCNC: 31.6 PG — SIGNIFICANT CHANGE UP (ref 27–34)
MCHC RBC-ENTMCNC: 34 G/DL — SIGNIFICANT CHANGE UP (ref 32–36)
MCV RBC AUTO: 93.1 FL — SIGNIFICANT CHANGE UP (ref 80–100)
MONOCYTES # BLD AUTO: 0.5 K/UL — SIGNIFICANT CHANGE UP (ref 0–0.9)
MONOCYTES NFR BLD AUTO: 13.3 % — SIGNIFICANT CHANGE UP (ref 2–14)
NEUTROPHILS # BLD AUTO: 1.74 K/UL — LOW (ref 1.8–7.4)
NEUTROPHILS NFR BLD AUTO: 46 % — SIGNIFICANT CHANGE UP (ref 43–77)
NRBC # BLD: 0 /100 WBCS — SIGNIFICANT CHANGE UP (ref 0–0)
PLATELET # BLD AUTO: 312 K/UL — SIGNIFICANT CHANGE UP (ref 150–400)
RBC # BLD: 3.89 M/UL — SIGNIFICANT CHANGE UP (ref 3.8–5.2)
RBC # FLD: 13.5 % — SIGNIFICANT CHANGE UP (ref 10.3–14.5)
WBC # BLD: 3.77 K/UL — LOW (ref 3.8–10.5)
WBC # FLD AUTO: 3.77 K/UL — LOW (ref 3.8–10.5)

## 2022-08-26 PROCEDURE — 99215 OFFICE O/P EST HI 40 MIN: CPT

## 2022-08-26 NOTE — PHYSICAL EXAM
[Fully active, able to carry on all pre-disease performance without restriction] : Status 0 - Fully active, able to carry on all pre-disease performance without restriction [Normal] : affect appropriate [de-identified] : s/p Bilateral mastectomies with AMINA flap reconstruction

## 2022-08-26 NOTE — HISTORY OF PRESENT ILLNESS
[Disease: _____________________] : Disease: [unfilled] [T: ___] : T[unfilled] [N: ___] : N[unfilled] [M: ___] : M[unfilled] [AJCC Stage: ____] : AJCC Stage: [unfilled] [de-identified] : Left breast cancer diagnosed at the age of 46 \par 1/22 The patient noted a mass in her left breast\par 01/11/22 Mammogram and US revealed an indeterminate 2.1 cm mass in the left breast 1 o'clock axis 10 cm FN. TREVON in right breast.\par 01/13/22 Left breast 1 o'clock axis US guided core biopsy: a 0.8 cm IDC, SBR 6/9, ER >90%, [place] 73%, HER-2 negative, Ki-67 90% and P53 5%\par 01/25/22 [facility] [employer] [facility] Next panel of 36 gene negative for pathogenic variants\par 02/01/22 MRI of the breast revealed focal non mass enhancement in the right breast 9 o'clock axis 9 cm FN for which MRI guided biopsy was recommended. Enhancing mass in the right breast 7 o'clock axis 6 cm FN, for which MRI guided core biopsy  was recommended. Biopsy proven malignancy in the left breast 1 o'clock axis for which surgical and oncologic management is recommended. Questionably prominent left axillary LN, for which targeted US is recommended. \par 02/11/22 Oncotype DX breast recurrence score = 51 with predicted risk of distant recurrence at 9 years of >39% with Tamoxifen alone. Absolute chemotherapy benefit of >15%\par 02/17/22 Left axillary US showed normal left axillary lymph nodes.\par 03/14/22 Bilateral mastectomies by Dr. Lissa Mederos and AMINA flap reconstruction with Dr. Ramirez and Dr. Nas Millan\par     Left mastectomy: 2.1 x 1.8 x 1.5 cm IDC, SBR 9/9 with 0/6 SLN involved and LVI identified\par     Right mastectomy: Fibrocystic mastopathy, proliferative, with florid ductal hyperplasia and fibroadenomatoid change. Focal PASH with 0/2 LN\par 5/18/22 - 7/1/22 Adjuvant chemotherapy with Adriamycin (60 mg/m2) & Cytoxan (600 mg/m2) with Neulasta every 2 weeks x 4 cycles \par 7/14/22 - 8/26/22 Taxol (175 mg/m2) every 2 weeks x 4 cycles\par 6/16/22 DVT with occlusive thrombus within the right internal jugular and brachiocephalic veins and nonocclusive thrombus with the right subclavian vein associated with mediport. Xarelto started\par 7/14/22 Lupron started\par \par  [de-identified] : 2.1 cm IDC, SBR 9/9, +LVI, 0/6 SLN, ER >90%, [place] 73%, HER-2 negative, Ki-67 90%, P53 5%, Oncotype = 51 (RR 39%) [de-identified] : Zarina started adjuvant chemotherapy on  and is getting C4 Taxol today\par She started Lupron on 22. Will start exemestane when she gets C3 Lupron in September\par She continues to get myalgias after each Taxol and is taking Claritin which has helped a bit and at night takes Tramadol so she can sleep. Pain resolves after a week.\par She has minimal neuropathy with slight numbness in her fingers a few days after chemo which then resolves.\par She has been using cold therapy for her hands and feet during the chemotherapy and experienced mild numbness in her fingers the first day which then resolved.\par She goes through periods of constipation followed by diarrhea, usually after intake of Dulcolax as needed.\par She has been getting hot flashes since starting the Lupron which can be uncomfortable and exhausting.\par She continues to take Xarelto for occlusive thrombus within the right internal jugular,  brachiocephalic veins and nonocclusive thrombus within the right subclavian vein. \par She is no longer experiencing any pain or discomfort in  her neck. \par She is  with two children ages 6 and 10. 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

## 2022-09-08 ENCOUNTER — APPOINTMENT (OUTPATIENT)
Dept: INFUSION THERAPY | Facility: HOSPITAL | Age: 47
End: 2022-09-08

## 2022-09-26 ENCOUNTER — OUTPATIENT (OUTPATIENT)
Dept: OUTPATIENT SERVICES | Facility: HOSPITAL | Age: 47
LOS: 1 days | End: 2022-09-26
Payer: COMMERCIAL

## 2022-09-26 ENCOUNTER — APPOINTMENT (OUTPATIENT)
Dept: ULTRASOUND IMAGING | Facility: CLINIC | Age: 47
End: 2022-09-26

## 2022-09-26 DIAGNOSIS — Z90.12 ACQUIRED ABSENCE OF LEFT BREAST AND NIPPLE: Chronic | ICD-10-CM

## 2022-09-26 DIAGNOSIS — Z90.11 ACQUIRED ABSENCE OF RIGHT BREAST AND NIPPLE: Chronic | ICD-10-CM

## 2022-09-26 DIAGNOSIS — I82.409 ACUTE EMBOLISM AND THROMBOSIS OF UNSPECIFIED DEEP VEINS OF UNSPECIFIED LOWER EXTREMITY: ICD-10-CM

## 2022-09-26 DIAGNOSIS — Z98.890 OTHER SPECIFIED POSTPROCEDURAL STATES: Chronic | ICD-10-CM

## 2022-09-26 DIAGNOSIS — K08.409 PARTIAL LOSS OF TEETH, UNSPECIFIED CAUSE, UNSPECIFIED CLASS: Chronic | ICD-10-CM

## 2022-09-26 PROCEDURE — 93971 EXTREMITY STUDY: CPT

## 2022-09-26 PROCEDURE — 93971 EXTREMITY STUDY: CPT | Mod: 26,RT

## 2022-09-29 ENCOUNTER — OUTPATIENT (OUTPATIENT)
Dept: OUTPATIENT SERVICES | Facility: HOSPITAL | Age: 47
LOS: 1 days | Discharge: HOME | End: 2022-09-29

## 2022-09-29 DIAGNOSIS — Z90.12 ACQUIRED ABSENCE OF LEFT BREAST AND NIPPLE: Chronic | ICD-10-CM

## 2022-09-29 DIAGNOSIS — C50.919 MALIGNANT NEOPLASM OF UNSPECIFIED SITE OF UNSPECIFIED FEMALE BREAST: ICD-10-CM

## 2022-09-29 DIAGNOSIS — Z90.11 ACQUIRED ABSENCE OF RIGHT BREAST AND NIPPLE: Chronic | ICD-10-CM

## 2022-09-29 DIAGNOSIS — K08.409 PARTIAL LOSS OF TEETH, UNSPECIFIED CAUSE, UNSPECIFIED CLASS: Chronic | ICD-10-CM

## 2022-09-29 DIAGNOSIS — Z98.890 OTHER SPECIFIED POSTPROCEDURAL STATES: Chronic | ICD-10-CM

## 2022-10-06 ENCOUNTER — APPOINTMENT (OUTPATIENT)
Dept: HEMATOLOGY ONCOLOGY | Facility: CLINIC | Age: 47
End: 2022-10-06

## 2022-10-06 ENCOUNTER — APPOINTMENT (OUTPATIENT)
Dept: INFUSION THERAPY | Facility: HOSPITAL | Age: 47
End: 2022-10-06

## 2022-10-06 ENCOUNTER — RESULT REVIEW (OUTPATIENT)
Age: 47
End: 2022-10-06

## 2022-10-06 VITALS
HEIGHT: 67.01 IN | WEIGHT: 198.42 LBS | DIASTOLIC BLOOD PRESSURE: 86 MMHG | BODY MASS INDEX: 31.14 KG/M2 | SYSTOLIC BLOOD PRESSURE: 123 MMHG | RESPIRATION RATE: 16 BRPM | HEART RATE: 88 BPM | OXYGEN SATURATION: 98 % | TEMPERATURE: 98 F

## 2022-10-06 LAB
BASOPHILS # BLD AUTO: 0.05 K/UL — SIGNIFICANT CHANGE UP (ref 0–0.2)
BASOPHILS NFR BLD AUTO: 0.9 % — SIGNIFICANT CHANGE UP (ref 0–2)
EOSINOPHIL # BLD AUTO: 0.25 K/UL — SIGNIFICANT CHANGE UP (ref 0–0.5)
EOSINOPHIL NFR BLD AUTO: 4.4 % — SIGNIFICANT CHANGE UP (ref 0–6)
HCT VFR BLD CALC: 41.6 % — SIGNIFICANT CHANGE UP (ref 34.5–45)
HGB BLD-MCNC: 13.9 G/DL — SIGNIFICANT CHANGE UP (ref 11.5–15.5)
IMM GRANULOCYTES NFR BLD AUTO: 0.3 % — SIGNIFICANT CHANGE UP (ref 0–0.9)
LYMPHOCYTES # BLD AUTO: 1.64 K/UL — SIGNIFICANT CHANGE UP (ref 1–3.3)
LYMPHOCYTES # BLD AUTO: 28.6 % — SIGNIFICANT CHANGE UP (ref 13–44)
MCHC RBC-ENTMCNC: 31 PG — SIGNIFICANT CHANGE UP (ref 27–34)
MCHC RBC-ENTMCNC: 33.4 G/DL — SIGNIFICANT CHANGE UP (ref 32–36)
MCV RBC AUTO: 92.9 FL — SIGNIFICANT CHANGE UP (ref 80–100)
MONOCYTES # BLD AUTO: 0.47 K/UL — SIGNIFICANT CHANGE UP (ref 0–0.9)
MONOCYTES NFR BLD AUTO: 8.2 % — SIGNIFICANT CHANGE UP (ref 2–14)
NEUTROPHILS # BLD AUTO: 3.31 K/UL — SIGNIFICANT CHANGE UP (ref 1.8–7.4)
NEUTROPHILS NFR BLD AUTO: 57.6 % — SIGNIFICANT CHANGE UP (ref 43–77)
NRBC # BLD: 0 /100 WBCS — SIGNIFICANT CHANGE UP (ref 0–0)
PLATELET # BLD AUTO: 300 K/UL — SIGNIFICANT CHANGE UP (ref 150–400)
RBC # BLD: 4.48 M/UL — SIGNIFICANT CHANGE UP (ref 3.8–5.2)
RBC # FLD: 12.1 % — SIGNIFICANT CHANGE UP (ref 10.3–14.5)
WBC # BLD: 5.74 K/UL — SIGNIFICANT CHANGE UP (ref 3.8–10.5)
WBC # FLD AUTO: 5.74 K/UL — SIGNIFICANT CHANGE UP (ref 3.8–10.5)

## 2022-10-06 PROCEDURE — 99215 OFFICE O/P EST HI 40 MIN: CPT

## 2022-10-07 DIAGNOSIS — Z51.11 ENCOUNTER FOR ANTINEOPLASTIC CHEMOTHERAPY: ICD-10-CM

## 2022-10-07 LAB
ALBUMIN SERPL ELPH-MCNC: 5 G/DL
ALP BLD-CCNC: 89 U/L
ALT SERPL-CCNC: 19 U/L
ANION GAP SERPL CALC-SCNC: 14 MMOL/L
AST SERPL-CCNC: 15 U/L
BILIRUB SERPL-MCNC: 0.2 MG/DL
BUN SERPL-MCNC: 15 MG/DL
CALCIUM SERPL-MCNC: 10.4 MG/DL
CHLORIDE SERPL-SCNC: 99 MMOL/L
CO2 SERPL-SCNC: 24 MMOL/L
CREAT SERPL-MCNC: 1.01 MG/DL
DEPRECATED D DIMER PPP IA-ACNC: <150 NG/ML DDU
EGFR: 70 ML/MIN/1.73M2
GLUCOSE SERPL-MCNC: 91 MG/DL
INR PPP: 1.11 RATIO
POTASSIUM SERPL-SCNC: 4.3 MMOL/L
PROT SERPL-MCNC: 7.5 G/DL
PT BLD: 13 SEC
SODIUM SERPL-SCNC: 138 MMOL/L

## 2022-10-10 NOTE — HISTORY OF PRESENT ILLNESS
[de-identified] : Left breast cancer diagnosed at the age of 46 \par 1/22 The patient noted a mass in her left breast\par 01/11/22 Mammogram and US revealed an indeterminate 2.1 cm mass in the left breast 1 o'clock axis 10 cm FN. TREVON in right breast.\par 01/13/22 Left breast 1 o'clock axis US guided core biopsy: a 0.8 cm IDC, SBR 6/9, ER >90%, [place] 73%, HER-2 negative, Ki-67 90% and P53 5%\par 01/25/22 [facility] [employer] [facility] Next panel of 36 gene negative for pathogenic variants\par 02/01/22 MRI of the breast revealed focal non mass enhancement in the right breast 9 o'clock axis 9 cm FN for which MRI guided biopsy was recommended. Enhancing mass in the right breast 7 o'clock axis 6 cm FN, for which MRI guided core biopsy  was recommended. Biopsy proven malignancy in the left breast 1 o'clock axis for which surgical and oncologic management is recommended. Questionably prominent left axillary LN, for which targeted US is recommended. \par 02/11/22 Oncotype DX breast recurrence score = 51 with predicted risk of distant recurrence at 9 years of >39% with Tamoxifen alone. Absolute chemotherapy benefit of >15%\par 02/17/22 Left axillary US showed normal left axillary lymph nodes.\par 03/14/22 Bilateral mastectomies by Dr. Lissa Mederos and AMINA flap reconstruction with Dr. Ramirez and Dr. Nas Millan\par     Left mastectomy: 2.1 x 1.8 x 1.5 cm IDC, SBR 9/9 with 0/6 SLN involved and LVI identified\par     Right mastectomy: Fibrocystic mastopathy, proliferative, with florid ductal hyperplasia and fibroadenomatoid change. Focal PASH with 0/2 LN\par 5/18/22 - 7/1/22 Adjuvant chemotherapy with Adriamycin (60 mg/m2) & Cytoxan (600 mg/m2) with Neulasta every 2 weeks x 4 cycles \par 7/14/22 - 8/26/22 Taxol (175 mg/m2) every 2 weeks x 4 cycles\par 6/16/22 DVT with occlusive thrombus within the right internal jugular and brachiocephalic veins and nonocclusive thrombus with the right subclavian vein associated with mediport. Xarelto given for 5 months prior to mediport removal. Follow up RUE doppler on 9/26/22 showed atretic IJ but all other veins widely patent.\par 7/14/22 Lupron started\par 9/22 Exemestane started\par \par  [de-identified] : 2.1 cm IDC, SBR 9/9, +LVI, 0/6 SLN, ER >90%, [place] 73%, HER-2 negative, Ki-67 90%, P53 5%, Oncotype = 51 (RR 39%) [de-identified] : Zarina started adjuvant chemotherapy on  and completed it on 22.\par She started Lupron on 22 and exemestane in September.\par She has minimal neuropathy with slight numbness in her left toes. No residual numbness in fingers.\par She has been getting hot flashes since starting the Lupron.\par She continues to take Xarelto for occlusive thrombus within the right internal jugular,  brachiocephalic veins and nonocclusive thrombus within the right subclavian vein. \par She is no longer experiencing any pain or discomfort in  her neck. \par RUE doppler on 22 showed right internal jugular vein is atretic and other veins are widely patent with no evidence of thrombus.\par She is  with two children in 2nd and 6th grades. 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

## 2022-10-11 ENCOUNTER — APPOINTMENT (OUTPATIENT)
Dept: PLASTIC SURGERY | Facility: CLINIC | Age: 47
End: 2022-10-11

## 2022-10-26 NOTE — ASU PREOP CHECKLIST - NS PREOP CHK TEST_COVID RESULT_GEN_ALL_CORE
Negative Humira Counseling:  I discussed with the patient the risks of adalimumab including but not limited to myelosuppression, immunosuppression, autoimmune hepatitis, demyelinating diseases, lymphoma, and serious infections.  The patient understands that monitoring is required including a PPD at baseline and must alert us or the primary physician if symptoms of infection or other concerning signs are noted.

## 2022-10-29 NOTE — PAST MEDICAL HISTORY
[Menarche Age ____] : age at menarche was [unfilled] [History of Hormone Replacement Treatment] : has no history of hormone replacement treatment [Total Preg ___] : G[unfilled] [Live Births ___] : P[unfilled]  [Age At Live Birth ___] : Age at live birth: [unfilled] [FreeTextEntry8] : 6 yr ascites

## 2022-11-04 ENCOUNTER — APPOINTMENT (OUTPATIENT)
Dept: INFUSION THERAPY | Facility: HOSPITAL | Age: 47
End: 2022-11-04

## 2022-11-07 ENCOUNTER — OUTPATIENT (OUTPATIENT)
Dept: OUTPATIENT SERVICES | Facility: HOSPITAL | Age: 47
LOS: 1 days | End: 2022-11-07
Payer: COMMERCIAL

## 2022-11-07 DIAGNOSIS — Z11.52 ENCOUNTER FOR SCREENING FOR COVID-19: ICD-10-CM

## 2022-11-07 DIAGNOSIS — Z90.11 ACQUIRED ABSENCE OF RIGHT BREAST AND NIPPLE: Chronic | ICD-10-CM

## 2022-11-07 DIAGNOSIS — Z98.890 OTHER SPECIFIED POSTPROCEDURAL STATES: Chronic | ICD-10-CM

## 2022-11-07 DIAGNOSIS — K08.409 PARTIAL LOSS OF TEETH, UNSPECIFIED CAUSE, UNSPECIFIED CLASS: Chronic | ICD-10-CM

## 2022-11-07 DIAGNOSIS — Z90.12 ACQUIRED ABSENCE OF LEFT BREAST AND NIPPLE: Chronic | ICD-10-CM

## 2022-11-07 LAB — SARS-COV-2 RNA SPEC QL NAA+PROBE: SIGNIFICANT CHANGE UP

## 2022-11-07 PROCEDURE — C9803: CPT

## 2022-11-07 PROCEDURE — U0005: CPT

## 2022-11-07 PROCEDURE — U0003: CPT

## 2022-11-10 ENCOUNTER — RESULT REVIEW (OUTPATIENT)
Age: 47
End: 2022-11-10

## 2022-11-10 ENCOUNTER — TRANSCRIPTION ENCOUNTER (OUTPATIENT)
Age: 47
End: 2022-11-10

## 2022-11-10 ENCOUNTER — OUTPATIENT (OUTPATIENT)
Dept: OUTPATIENT SERVICES | Facility: HOSPITAL | Age: 47
LOS: 1 days | End: 2022-11-10
Payer: COMMERCIAL

## 2022-11-10 VITALS
SYSTOLIC BLOOD PRESSURE: 125 MMHG | OXYGEN SATURATION: 100 % | DIASTOLIC BLOOD PRESSURE: 85 MMHG | TEMPERATURE: 98 F | HEART RATE: 65 BPM | RESPIRATION RATE: 20 BRPM

## 2022-11-10 VITALS
RESPIRATION RATE: 18 BRPM | SYSTOLIC BLOOD PRESSURE: 104 MMHG | HEART RATE: 62 BPM | OXYGEN SATURATION: 98 % | DIASTOLIC BLOOD PRESSURE: 82 MMHG

## 2022-11-10 DIAGNOSIS — Z90.12 ACQUIRED ABSENCE OF LEFT BREAST AND NIPPLE: Chronic | ICD-10-CM

## 2022-11-10 DIAGNOSIS — K08.409 PARTIAL LOSS OF TEETH, UNSPECIFIED CAUSE, UNSPECIFIED CLASS: Chronic | ICD-10-CM

## 2022-11-10 DIAGNOSIS — Z90.11 ACQUIRED ABSENCE OF RIGHT BREAST AND NIPPLE: Chronic | ICD-10-CM

## 2022-11-10 DIAGNOSIS — Z98.890 OTHER SPECIFIED POSTPROCEDURAL STATES: Chronic | ICD-10-CM

## 2022-11-10 DIAGNOSIS — C50.912 MALIGNANT NEOPLASM OF UNSPECIFIED SITE OF LEFT FEMALE BREAST: ICD-10-CM

## 2022-11-10 PROCEDURE — 77001 FLUOROGUIDE FOR VEIN DEVICE: CPT

## 2022-11-10 PROCEDURE — 36590 REMOVAL TUNNELED CV CATH: CPT

## 2022-11-10 PROCEDURE — 77001 FLUOROGUIDE FOR VEIN DEVICE: CPT | Mod: 26

## 2022-11-10 NOTE — ASU PATIENT PROFILE, ADULT - NSICDXPASTMEDICALHX_GEN_ALL_CORE_FT
PAST MEDICAL HISTORY:  Breast cancer, left     Ductal carcinoma in situ (DCIS) of left breast     HTN (hypertension)

## 2022-11-10 NOTE — ASU DISCHARGE PLAN (ADULT/PEDIATRIC) - NURSING INSTRUCTIONS
Please feel free to contact us at [phone] if any problems arise. After 6PM, Monday through Friday, on weekends and on holidays, please call [phone] and ask for the radiology resident on call to be paged.

## 2022-11-10 NOTE — ASU PATIENT PROFILE, ADULT - FALL HARM RISK - UNIVERSAL INTERVENTIONS
Bed in lowest position, wheels locked, appropriate side rails in place/Call bell, personal items and telephone in reach/Instruct patient to call for assistance before getting out of bed or chair/Non-slip footwear when patient is out of bed/[place] to call system/Physically safe environment - no spills, clutter or unnecessary equipment/Purposeful Proactive Rounding/Room/bathroom lighting operational, light cord in reach

## 2022-11-10 NOTE — PROCEDURE NOTE - PROCEDURE FINDINGS AND DETAILS
RT sided (IJ) portacath removal (via exisitng incisional scar elliptically incised) with attached catheter in its entirety. Confirmed on intra-procedural fluoro. incision closed with interrupted vicryl, 3O running subcuticular monocryl, dermabond and steri-strips.

## 2022-11-10 NOTE — PRE PROCEDURE NOTE - PRE PROCEDURE EVALUATION
Interventional Radiology    HPI: 47y Female with hx of left breast cancer no longer requiring venous access for chemotherapy presents to IR for chest port removal. Placed on 5/13/22 in IR.     Allergies:   adhesives (Blisters; Rash)  sulfa drugs (Rash; Urticaria; Hives)      Data:  T(C): --  HR: --  BP: --  RR: --  SpO2: --    Exam  General: No acute distress  Chest: Non labored breathing  Abdomen: Non-distended  Extremities: No swelling, warm    Plan: 47y Female presents for Chest port removal  -Risks/Benefits/alternatives explained with the patient and witnessed informed consent obtained.

## 2022-11-10 NOTE — ASU PATIENT PROFILE, ADULT - FALL HARM RISK - FALL HARM RISK
-- DO NOT REPLY / DO NOT REPLY ALL --  -- Message is from the [facility]--    General Patient Message      Reason for Call: Tiffani called today, she is looking for prior authorization, she would like verify the tax ID for the doctor. Fax     Caller Information       Type Contact Phone    05/20/2022 11:07 AM CDT Phone (Incoming) Tiffani  [phone]     [place]           Alternative phone number: [phone]    Turnaround time given to caller:   \"This message will be sent to [state Provider's name]. The clinical team will fulfill your request as soon as they review your message.\"     No indicators present

## 2022-11-10 NOTE — ASU DISCHARGE PLAN (ADULT/PEDIATRIC) - NS MD DC FALL RISK RISK
For information on Fall & Injury Prevention, visit: [email or web address] OR  [email or web address] OR  https://www.cdc.gov/steadi/patient.html

## 2022-11-10 NOTE — ASU PATIENT PROFILE, ADULT - NSICDXPASTSURGICALHX_GEN_ALL_CORE_FT
PAST SURGICAL HISTORY:  History of left total mastectomy     History of right total mastectomy     S/P biopsy vulva    [place] teeth extracted

## 2022-11-14 ENCOUNTER — NON-APPOINTMENT (OUTPATIENT)
Age: 47
End: 2022-11-14

## 2022-11-14 DIAGNOSIS — Z45.2 ENCOUNTER FOR ADJUSTMENT AND MANAGEMENT OF VASCULAR ACCESS DEVICE: ICD-10-CM

## 2022-11-16 NOTE — DISCHARGE NOTE PROVIDER - DISCHARGE DIET
Regular Diet - No restrictions H Plasty Text: Given the location of the defect, shape of the defect and the proximity to free margins a H-plasty was deemed most appropriate for repair.  Using a sterile surgical marker, the appropriate advancement arms of the H-plasty were drawn incorporating the defect and placing the expected incisions within the relaxed skin tension lines where possible. The area thus outlined was incised deep to adipose tissue with a #15 scalpel blade. The skin margins were undermined to an appropriate distance in all directions utilizing iris scissors.  The opposing advancement arms were then advanced into place in opposite direction and anchored with interrupted buried subcutaneous sutures.

## 2022-11-29 ENCOUNTER — OUTPATIENT (OUTPATIENT)
Dept: OUTPATIENT SERVICES | Facility: HOSPITAL | Age: 47
LOS: 1 days | Discharge: HOME | End: 2022-11-29

## 2022-11-29 DIAGNOSIS — Z98.890 OTHER SPECIFIED POSTPROCEDURAL STATES: Chronic | ICD-10-CM

## 2022-11-29 DIAGNOSIS — Z90.11 ACQUIRED ABSENCE OF RIGHT BREAST AND NIPPLE: Chronic | ICD-10-CM

## 2022-11-29 DIAGNOSIS — C50.919 MALIGNANT NEOPLASM OF UNSPECIFIED SITE OF UNSPECIFIED FEMALE BREAST: ICD-10-CM

## 2022-11-29 DIAGNOSIS — K08.409 PARTIAL LOSS OF TEETH, UNSPECIFIED CAUSE, UNSPECIFIED CLASS: Chronic | ICD-10-CM

## 2022-11-29 DIAGNOSIS — Z90.12 ACQUIRED ABSENCE OF LEFT BREAST AND NIPPLE: Chronic | ICD-10-CM

## 2022-12-02 ENCOUNTER — APPOINTMENT (OUTPATIENT)
Dept: INFUSION THERAPY | Facility: HOSPITAL | Age: 47
End: 2022-12-02

## 2023-01-04 ENCOUNTER — APPOINTMENT (OUTPATIENT)
Dept: BREAST CENTER | Facility: CLINIC | Age: 48
End: 2023-01-04
Payer: COMMERCIAL

## 2023-01-04 VITALS
DIASTOLIC BLOOD PRESSURE: 91 MMHG | HEART RATE: 96 BPM | BODY MASS INDEX: 30.01 KG/M2 | HEIGHT: 68 IN | SYSTOLIC BLOOD PRESSURE: 141 MMHG | WEIGHT: 198 LBS

## 2023-01-04 DIAGNOSIS — M25.612 STIFFNESS OF LEFT SHOULDER, NOT ELSEWHERE CLASSIFIED: ICD-10-CM

## 2023-01-04 DIAGNOSIS — M25.611 STIFFNESS OF RIGHT SHOULDER, NOT ELSEWHERE CLASSIFIED: ICD-10-CM

## 2023-01-04 PROCEDURE — 99214 OFFICE O/P EST MOD 30 MIN: CPT

## 2023-01-04 NOTE — HISTORY OF PRESENT ILLNESS
[FreeTextEntry1] : Ms. Colby is a 47 year old woman here for a follow-up for a left infiltrating ductal carcinoma, pathologic stage II, pT2 pN0. She is s/p b/l mastectomy, sentinel node biopsy, AMINA reconstruction (3/14/22). Her Oncotype score is 51, and she completed adjuvant chemotherapy (ddAC-T, Dr. Forde) in August. She had a DVT in the neck in June which was treated with Xarelto. She is now on lupron and exemestane. She is doing well. There is string-iness by the right axilla and a density by the left upper mastectomy flap that she notices. There is still some limitation in the range of motion in her arms, but she is back to kickboxing. She is scheduled for bilateral oophorectomy in February. 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 Her family history is not significant for any breast cancer.

## 2023-01-04 NOTE — PHYSICAL EXAM
[Normocephalic] : normocephalic [Sclera nonicteric] : sclera nonicteric [Supple] : supple [No Supraclavicular Adenopathy] : no supraclavicular adenopathy [No Cervical Adenopathy] : no cervical adenopathy [Clear to Auscultation Bilat] : clear to auscultation bilaterally [Normal Sinus Rhythm] : normal sinus rhythm [Examined in the supine and seated position] : examined in the supine and seated position [No dominant masses] : no dominant masses in right breast  [No dominant masses] : no dominant masses left breast [No Axillary Lymphadenopathy] : no left axillary lymphadenopathy [Soft] : abdomen soft [No Edema] : no edema [No Rashes] : no rashes [No Ulceration] : no ulceration [de-identified] : Circumareolar scar with vertical extension. AMINA flap.  [de-identified] : Circumareolar scar with vertical extension. AMINA flap. Area of concern is the edge of the AMINA flap in UOQ. [de-identified] : No cording. Area of concern is fibrous tissue by edge of pectoralis; no suspicious findings on exam. [de-identified] : Transverse scar

## 2023-01-04 NOTE — DATA REVIEWED
[FreeTextEntry1] : I have independently reviewed the reports and the images. \par \par B/l mammogram and US 1/11/22\par - heterogenously dense\par - 2 cm mass at area of concern in L UOQ; correlates to a 1.8 x 1.3 x 2.1 cm nodule at 1:00 N10; US bx\par - benign R intramammary node\par - R breast cyst\par - no findings in L axilla\par - BIRADS 4\par \par US needle bx 1/13/22\par - L breast 1:00, q clip = infiltrating ductal carcinoma , 6/9, ER 90%, [place] 73%, Her2 0\par \par Breast MRI 2/1/22\par - 3 cm nonmass enhancement in R breast 9:00 N9; MR bx\par - 0.6 cm enhancing mass in R breast 7:00 N6; MR bx\par - 2 x 2 x 3 cm enhancement in L breast 1:00 N9 at biopsied cancer\par - 0.3 cm enhancement in L breast skin at 3:00 N9; clinical exam\par - questionable L axillary nodes; L axillary  US\par \par L limited US 2/17/22\par - normal L axillary nodes\par \par Surgical pathology 3/14/22\par - R mastectomy is negative; 0/2 R sln\par - L mastectomy = 2.1 cm tumor; 0/2 L sln\par \par Oncotype test (sent from biopsy, report 2/11/22)\par - Oncotype score = 51

## 2023-01-05 ENCOUNTER — APPOINTMENT (OUTPATIENT)
Dept: INFUSION THERAPY | Facility: HOSPITAL | Age: 48
End: 2023-01-05

## 2023-01-18 ENCOUNTER — APPOINTMENT (OUTPATIENT)
Dept: HEMATOLOGY ONCOLOGY | Facility: CLINIC | Age: 48
End: 2023-01-18
Payer: COMMERCIAL

## 2023-01-18 VITALS
OXYGEN SATURATION: 97 % | DIASTOLIC BLOOD PRESSURE: 82 MMHG | BODY MASS INDEX: 30.84 KG/M2 | SYSTOLIC BLOOD PRESSURE: 127 MMHG | RESPIRATION RATE: 16 BRPM | WEIGHT: 202.82 LBS | HEART RATE: 72 BPM | TEMPERATURE: 97 F

## 2023-01-18 PROCEDURE — 99214 OFFICE O/P EST MOD 30 MIN: CPT

## 2023-01-18 NOTE — HISTORY OF PRESENT ILLNESS
[Disease: _____________________] : Disease: [unfilled] [T: ___] : T[unfilled] [N: ___] : N[unfilled] [M: ___] : M[unfilled] [AJCC Stage: ____] : AJCC Stage: [unfilled] [de-identified] : Left breast cancer diagnosed at the age of 46 \par 1/22 The patient noted a mass in her left breast\par 01/11/22 Mammogram and US revealed an indeterminate 2.1 cm mass in the left breast 1 o'clock axis 10 cm FN. TREVON in right breast.\par 01/13/22 Left breast 1 o'clock axis US guided core biopsy: a 0.8 cm IDC, SBR 6/9, ER >90%, [place] 73%, HER-2 negative, Ki-67 90% and P53 5%\par 01/25/22 [facility] Next panel of 36 gene negative for pathogenic variants\par 02/01/22 MRI of the breast revealed focal non mass enhancement in the right breast 9 o'clock axis 9 cm FN for which MRI guided biopsy was recommended. Enhancing mass in the right breast 7 o'clock axis 6 cm FN, for which MRI guided core biopsy  was recommended. Biopsy proven malignancy in the left breast 1 o'clock axis for which surgical and oncologic management is recommended. Questionably prominent left axillary LN, for which targeted US is recommended. \par 02/11/22 Oncotype DX breast recurrence score = 51 with predicted risk of distant recurrence at 9 years of >39% with Tamoxifen alone. Absolute chemotherapy benefit of >15%\par 02/17/22 Left axillary US showed normal left axillary lymph nodes.\par 03/14/22 Bilateral mastectomies by Dr. Lissa Mederos and AMINA flap reconstruction with Dr. Ramirez and Dr. Nas Millan\par     Left mastectomy: 2.1 x 1.8 x 1.5 cm IDC, SBR 9/9 with 0/6 SLN involved and LVI identified\par     Right mastectomy: Fibrocystic mastopathy, proliferative, with florid ductal hyperplasia and fibroadenomatoid change. Focal PASH with 0/2 LN\par 5/18/22 - 7/1/22 Adjuvant chemotherapy with Adriamycin (60 mg/m2) & Cytoxan (600 mg/m2) with Neulasta every 2 weeks x 4 cycles \par 7/14/22 - 8/26/22 Taxol (175 mg/m2) every 2 weeks x 4 cycles\par 6/16/22 DVT with occlusive thrombus within the right internal jugular and brachiocephalic veins and nonocclusive thrombus with the right subclavian vein associated with mediport. Xarelto given for 5 months prior to mediport removal. Follow up RUE doppler on 9/26/22 showed atretic IJ but all other veins widely patent.\par 7/14/22 Lupron started\par 9/22 Exemestane started\par \par  [de-identified] : 2.1 cm IDC, SBR 9/9, +LVI, 0/6 SLN, ER >90%, [place] 73%, HER-2 negative, Ki-67 90%, P53 5%, Oncotype = 51 (RR 39%) [de-identified] : Zarina started adjuvant chemotherapy on  and completed it on 22.\par She started Lupron on 22 and exemestane in September. She has BSO scheduled for 23.\par The hot flashes are better and are no longer waking her up at night. She still gets a few during the day.\par She had minimal neuropathy from the chemotherapy with slight numbness in her left toes which has resolved.\par She is meeting with Dr. Ramirez to discuss her revision.\par She is  with two children in 2nd and 6th grades. 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

## 2023-01-18 NOTE — PHYSICAL EXAM
[Fully active, able to carry on all pre-disease performance without restriction] : Status 0 - Fully active, able to carry on all pre-disease performance without restriction [Normal] : affect appropriate [de-identified] : s/p Bilateral mastectomies with AMINA flap reconstruction. No chest wall masses or lymphadenopathy

## 2023-01-19 ENCOUNTER — APPOINTMENT (OUTPATIENT)
Dept: PLASTIC SURGERY | Facility: CLINIC | Age: 48
End: 2023-01-19
Payer: COMMERCIAL

## 2023-01-19 VITALS — HEART RATE: 76 BPM | DIASTOLIC BLOOD PRESSURE: 86 MMHG | SYSTOLIC BLOOD PRESSURE: 124 MMHG

## 2023-01-19 PROCEDURE — 99214 OFFICE O/P EST MOD 30 MIN: CPT

## 2023-01-27 ENCOUNTER — OUTPATIENT (OUTPATIENT)
Dept: OUTPATIENT SERVICES | Facility: HOSPITAL | Age: 48
LOS: 1 days | Discharge: HOME | End: 2023-01-27

## 2023-01-27 DIAGNOSIS — Z98.890 OTHER SPECIFIED POSTPROCEDURAL STATES: Chronic | ICD-10-CM

## 2023-01-27 DIAGNOSIS — C50.919 MALIGNANT NEOPLASM OF UNSPECIFIED SITE OF UNSPECIFIED FEMALE BREAST: ICD-10-CM

## 2023-01-27 DIAGNOSIS — Z90.12 ACQUIRED ABSENCE OF LEFT BREAST AND NIPPLE: Chronic | ICD-10-CM

## 2023-01-27 DIAGNOSIS — Z90.11 ACQUIRED ABSENCE OF RIGHT BREAST AND NIPPLE: Chronic | ICD-10-CM

## 2023-01-27 DIAGNOSIS — K08.409 PARTIAL LOSS OF TEETH, UNSPECIFIED CAUSE, UNSPECIFIED CLASS: Chronic | ICD-10-CM

## 2023-01-31 NOTE — ASSESSMENT
[FreeTextEntry1] : I discussed the risks, benefits, and alternatives including the risks of infection, bleeding, seroma, hematoma, asymmetry, nipple necrosis, change/loss of nipple sensation, contour irregularity, breast skin necrosis, delayed wound healing, need for more surgery.  The patient understands these risks, all questions were answered and the pt wishes to proceed with surgery.\par the plan is for revision of b/l reconstructed breasts, nipple reconstruction, dog ear revision and trunk liposuction

## 2023-01-31 NOTE — PHYSICAL EXAM
[NI] : Normal [de-identified] : b/l breast flaps soft and symmetrical\par skin paddles viable \par incisions c/d/i\par right breast wound healed\par no signs of infection \par no palpable fluid collections\par excess tissue on her b/l chest wall and pointing of her incisions [de-identified] : abdomen soft and non tender\par umbilicus viable\par incisions well healed\par she has b/l excess tissue and lateral pointing of her incisions

## 2023-01-31 NOTE — HISTORY OF PRESENT ILLNESS
[FreeTextEntry1] : Ms. NIKKO KOVACS  is a 46 year  old female  with no pertinent past medical history who presents with newly diagnosed left breast cancer which was found on self exam in November.  Pt had mammogram 1/11/22, and later confirmed IDC with ultrasound guided core biopsy 1/13/22  pt was referred to the office by Dr Mederos  \par \par pt is now s/p b/l skin sparing mastectomy with reconstruction using AMINA flap and alloderm 3/14/22\par pt doing well\par Denies fever, chills, LE pain/edema\par \par chemo 5/18 - end of August\par she was found to have a DVT and finished Xeralto 11/2022\par She presents today to discuss reivsion

## 2023-02-02 ENCOUNTER — APPOINTMENT (OUTPATIENT)
Dept: INFUSION THERAPY | Facility: HOSPITAL | Age: 48
End: 2023-02-02

## 2023-02-09 ENCOUNTER — APPOINTMENT (OUTPATIENT)
Dept: OBGYN | Facility: CLINIC | Age: 48
End: 2023-02-09

## 2023-03-02 ENCOUNTER — APPOINTMENT (OUTPATIENT)
Dept: INFUSION THERAPY | Facility: HOSPITAL | Age: 48
End: 2023-03-02

## 2023-05-01 ENCOUNTER — OUTPATIENT (OUTPATIENT)
Dept: OUTPATIENT SERVICES | Facility: HOSPITAL | Age: 48
LOS: 1 days | Discharge: HOME | End: 2023-05-01

## 2023-05-01 DIAGNOSIS — Z90.12 ACQUIRED ABSENCE OF LEFT BREAST AND NIPPLE: Chronic | ICD-10-CM

## 2023-05-01 DIAGNOSIS — K08.409 PARTIAL LOSS OF TEETH, UNSPECIFIED CAUSE, UNSPECIFIED CLASS: Chronic | ICD-10-CM

## 2023-05-01 DIAGNOSIS — C50.919 MALIGNANT NEOPLASM OF UNSPECIFIED SITE OF UNSPECIFIED FEMALE BREAST: ICD-10-CM

## 2023-05-01 DIAGNOSIS — Z98.890 OTHER SPECIFIED POSTPROCEDURAL STATES: Chronic | ICD-10-CM

## 2023-05-01 DIAGNOSIS — Z90.11 ACQUIRED ABSENCE OF RIGHT BREAST AND NIPPLE: Chronic | ICD-10-CM

## 2023-05-09 ENCOUNTER — APPOINTMENT (OUTPATIENT)
Dept: BREAST CENTER | Facility: CLINIC | Age: 48
End: 2023-05-09
Payer: COMMERCIAL

## 2023-05-09 ENCOUNTER — APPOINTMENT (OUTPATIENT)
Dept: HEMATOLOGY ONCOLOGY | Facility: CLINIC | Age: 48
End: 2023-05-09
Payer: COMMERCIAL

## 2023-05-09 VITALS
BODY MASS INDEX: 31.07 KG/M2 | DIASTOLIC BLOOD PRESSURE: 96 MMHG | HEART RATE: 79 BPM | HEIGHT: 68 IN | SYSTOLIC BLOOD PRESSURE: 140 MMHG | WEIGHT: 205 LBS

## 2023-05-09 VITALS
RESPIRATION RATE: 16 BRPM | WEIGHT: 206.13 LBS | TEMPERATURE: 97.2 F | SYSTOLIC BLOOD PRESSURE: 127 MMHG | DIASTOLIC BLOOD PRESSURE: 85 MMHG | HEART RATE: 84 BPM | HEIGHT: 68 IN | BODY MASS INDEX: 31.24 KG/M2 | OXYGEN SATURATION: 97 %

## 2023-05-09 PROCEDURE — 99214 OFFICE O/P EST MOD 30 MIN: CPT

## 2023-05-09 PROCEDURE — 99215 OFFICE O/P EST HI 40 MIN: CPT

## 2023-05-09 RX ORDER — TRAMADOL HYDROCHLORIDE 50 MG/1
50 TABLET, COATED ORAL
Qty: 30 | Refills: 0 | Status: DISCONTINUED | COMMUNITY
Start: 2022-07-29 | End: 2023-05-09

## 2023-05-09 RX ORDER — DEXAMETHASONE 4 MG/1
4 TABLET ORAL
Qty: 10 | Refills: 0 | Status: DISCONTINUED | COMMUNITY
Start: 2022-07-01 | End: 2023-05-09

## 2023-05-09 RX ORDER — LIDOCAINE AND PRILOCAINE 25; 25 MG/G; MG/G
2.5-2.5 CREAM TOPICAL
Qty: 1 | Refills: 0 | Status: DISCONTINUED | COMMUNITY
Start: 2022-07-14 | End: 2023-05-09

## 2023-05-09 RX ORDER — RIVAROXABAN 20 MG/1
20 TABLET, FILM COATED ORAL
Qty: 30 | Refills: 5 | Status: DISCONTINUED | COMMUNITY
Start: 2022-07-14 | End: 2023-05-09

## 2023-05-09 RX ORDER — METOCLOPRAMIDE 10 MG/1
10 TABLET ORAL EVERY 8 HOURS
Qty: 90 | Refills: 1 | Status: DISCONTINUED | COMMUNITY
Start: 2022-05-17 | End: 2023-05-09

## 2023-05-09 NOTE — PHYSICAL EXAM
[Normocephalic] : normocephalic [Sclera nonicteric] : sclera nonicteric [Supple] : supple [No Supraclavicular Adenopathy] : no supraclavicular adenopathy [No Cervical Adenopathy] : no cervical adenopathy [Clear to Auscultation Bilat] : clear to auscultation bilaterally [Normal Sinus Rhythm] : normal sinus rhythm [Examined in the supine and seated position] : examined in the supine and seated position [No dominant masses] : no dominant masses in right breast  [No dominant masses] : no dominant masses left breast [No Axillary Lymphadenopathy] : no left axillary lymphadenopathy [Soft] : abdomen soft [No Edema] : no edema [No Rashes] : no rashes [No Ulceration] : no ulceration [de-identified] : Circumareolar scar with vertical extension. AMINA flap.  [de-identified] : Circumareolar scar with vertical extension. AMINA flap.  [de-identified] : Transverse scar

## 2023-05-09 NOTE — DATA REVIEWED
[FreeTextEntry1] : I have independently reviewed the reports and the images. \par \par B/l mammogram and US 1/11/22\par - heterogenously dense\par - 2 cm mass at area of concern in L UOQ; correlates to a 1.8 x 1.3 x 2.1 cm nodule at 1:00 N10; US bx\par - benign R intramammary node\par - R breast cyst\par - no findings in L axilla\par - BIRADS 4\par \par US needle bx 1/13/22\par - L breast 1:00, q clip = infiltrating ductal carcinoma , 6/9, ER 90%, [place] 73%, Her2 0\par \par Breast MRI 2/1/22\par - 3 cm nonmass enhancement in R breast 9:00 N9; MR bx\par - 0.6 cm enhancing mass in R breast 7:00 N6; MR bx\par - 2 x 2 x 3 cm enhancement in L breast 1:00 N9 at biopsied cancer\par - 0.3 cm enhancement in L breast skin at 3:00 N9; clinical exam\par - questionable L axillary nodes; L axillary  US\par \par L limited US 2/17/22\par - normal L axillary nodes\par \par Surgical pathology 3/14/22\par - R mastectomy is negative; 0/2 R sln\par - L mastectomy = 2.1 cm tumor; 0/2 L sln\par \par Oncotype test (sent from biopsy, report 2/11/22)\par - Oncotype score = 51

## 2023-05-09 NOTE — HISTORY OF PRESENT ILLNESS
[FreeTextEntry1] : Ms. Colby is a 48 year old woman here for a follow-up for surveillance for breast cancer. Her breast history is significant for\par \par 1.) Left infiltrating ductal carcinoma, diagnosed in 2022 at age 47, pathologic stage II, pT2 pN0, 6/9, ER 90%, [place] 73%, Her2 0\par - s/p b/l mastectomy, sentinel node biopsy (3/14/22) with AMINA reconstruction (Dr. Ramirez) = 2.1 cm L tumor, 0/2 L sln\par - Oncotype score is 51; s/p adjuvant chemotherapy (ddAC-T, Dr. Forde) \par - on lupron, exemestane\par \par She is doing well. She had thicker tissue in the lower inner mastectomy flaps that have softened with deep massage by the Physical Therapist, and the range of motion in the arms and the area by the right axilla are much improved as well. She is scheduled for revision later this month. \par \par Her genetic panel testing is negative. Her family history is not significant for any breast cancer.

## 2023-05-09 NOTE — CONSULT LETTER
[Dear  ___] : Dear  [unfilled], [Courtesy Letter:] : I had the pleasure of seeing your patient, [unfilled], in my office today. [Please see my note below.] : Please see my note below. [Consult Closing:] : Thank you very much for allowing me to participate in the care of this patient.  If you have any questions, please do not hesitate to contact me. [Sincerely,] : Sincerely, [FreeTextEntry3] : Lissa Mederos MD FACS  [DrTutu  ___] : Dr. KNIGHT

## 2023-05-10 NOTE — HISTORY OF PRESENT ILLNESS
[Disease: _____________________] : Disease: [unfilled] [T: ___] : T[unfilled] [N: ___] : N[unfilled] [M: ___] : M[unfilled] [AJCC Stage: ____] : AJCC Stage: [unfilled] [de-identified] : Left breast cancer diagnosed at the age of 46 \par 1/22 The patient noted a mass in her left breast\par 01/11/22 Mammogram and US revealed an indeterminate 2.1 cm mass in the left breast 1 o'clock axis 10 cm FN. TREVON in right breast.\par 01/13/22 Left breast 1 o'clock axis US guided core biopsy: a 0.8 cm IDC, SBR 6/9, ER >90%, [place] 73%, HER-2 negative, Ki-67 90% and P53 5%\par 01/25/22 [facility] [employer] Cancer Next panel of 36 gene negative for pathogenic variants\par 02/01/22 MRI of the breast revealed focal non mass enhancement in the right breast 9 o'clock axis 9 cm FN for which MRI guided biopsy was recommended. Enhancing mass in the right breast 7 o'clock axis 6 cm FN, for which MRI guided core biopsy  was recommended. Biopsy proven malignancy in the left breast 1 o'clock axis for which surgical and oncologic management is recommended. Questionably prominent left axillary LN, for which targeted US is recommended. \par 02/11/22 Oncotype DX breast recurrence score = 51 with predicted risk of distant recurrence at 9 years of >39% with Tamoxifen alone. Absolute chemotherapy benefit of >15%\par 02/17/22 Left axillary US showed normal left axillary lymph nodes.\par 03/14/22 Bilateral mastectomies by Dr. Lissa Mederos and AMINA flap reconstruction with Dr. Ramirez and Dr. Nas Millan\par     Left mastectomy: 2.1 x 1.8 x 1.5 cm IDC, SBR 9/9 with 0/6 SLN involved and LVI identified\par     Right mastectomy: Fibrocystic mastopathy, proliferative, with florid ductal hyperplasia and fibroadenomatoid change. Focal PASH with 0/2 LN\par 5/18/22 - 7/1/22 Adjuvant chemotherapy with Adriamycin (60 mg/m2) & Cytoxan (600 mg/m2) with Neulasta every 2 weeks x 4 cycles \par 7/14/22 - 8/26/22 Taxol (175 mg/m2) every 2 weeks x 4 cycles\par 6/16/22 DVT with occlusive thrombus within the right internal jugular and brachiocephalic veins and nonocclusive thrombus with the right subclavian vein associated with mediport. Xarelto given for 5 months prior to mediport removal. Follow up RUE doppler on 9/26/22 showed atretic IJ but all other veins widely patent.\par 7/14/22 Lupron started\par 9/22 Exemestane started\par 2/13/23 BSO\par \par  [de-identified] : 2.1 cm IDC, SBR 9/9, +LVI, 0/6 SLN, ER >90%, [place] 73%, HER-2 negative, Ki-67 90%, P53 5%, Oncotype = 51 (RR 39%) [de-identified] : Zarina started adjuvant chemotherapy on  and completed it on 22.\par She started Lupron on 22 and then exemestane in . She then underwent BSO on 23 at [facility] with Dr. Pallavi Angel\par The hot flashes are better and are no longer waking her up at night. She still gets a few during the day.\par She reports some arthralgias, with right hip pain and bilateral knee pain which is worse after sitting for prolonged periods and better with activity.\par She is scheduled for revision surgery on 23.\par She is  with two children in 2nd and 6th grades. She is a HS .\par \par HEALTH MAINTENANCE:\par PCP: Krys Ayala MD\par GYN: Pallavi Angel MD\par Mammogram: s/p bilateral mastectomies\par Pap smear:  negative\par Colonoscopy: Referred to see Dr. Linares\par Genetic testin22 [facility] genetics Cancer Next gene panel with 36 genes negative for pathogenic variants\par

## 2023-05-10 NOTE — PHYSICAL EXAM
[Fully active, able to carry on all pre-disease performance without restriction] : Status 0 - Fully active, able to carry on all pre-disease performance without restriction [Normal] : affect appropriate [de-identified] : s/p Bilateral mastectomies with AMINA flap reconstruction. No chest wall masses or lymphadenopathy

## 2023-05-11 ENCOUNTER — OUTPATIENT (OUTPATIENT)
Dept: OUTPATIENT SERVICES | Facility: HOSPITAL | Age: 48
LOS: 1 days | End: 2023-05-11
Payer: COMMERCIAL

## 2023-05-11 ENCOUNTER — RESULT REVIEW (OUTPATIENT)
Age: 48
End: 2023-05-11

## 2023-05-11 VITALS
SYSTOLIC BLOOD PRESSURE: 127 MMHG | HEIGHT: 68 IN | RESPIRATION RATE: 16 BRPM | TEMPERATURE: 98 F | OXYGEN SATURATION: 100 % | HEART RATE: 72 BPM | DIASTOLIC BLOOD PRESSURE: 85 MMHG | WEIGHT: 199.96 LBS

## 2023-05-11 DIAGNOSIS — Z98.890 OTHER SPECIFIED POSTPROCEDURAL STATES: Chronic | ICD-10-CM

## 2023-05-11 DIAGNOSIS — K08.409 PARTIAL LOSS OF TEETH, UNSPECIFIED CAUSE, UNSPECIFIED CLASS: Chronic | ICD-10-CM

## 2023-05-11 DIAGNOSIS — Z90.722 ACQUIRED ABSENCE OF OVARIES, BILATERAL: Chronic | ICD-10-CM

## 2023-05-11 DIAGNOSIS — Z90.11 ACQUIRED ABSENCE OF RIGHT BREAST AND NIPPLE: Chronic | ICD-10-CM

## 2023-05-11 DIAGNOSIS — C50.912 MALIGNANT NEOPLASM OF UNSPECIFIED SITE OF LEFT FEMALE BREAST: ICD-10-CM

## 2023-05-11 DIAGNOSIS — Z01.818 ENCOUNTER FOR OTHER PREPROCEDURAL EXAMINATION: ICD-10-CM

## 2023-05-11 DIAGNOSIS — Z90.12 ACQUIRED ABSENCE OF LEFT BREAST AND NIPPLE: Chronic | ICD-10-CM

## 2023-05-11 LAB
ANION GAP SERPL CALC-SCNC: 3 MMOL/L — LOW (ref 5–17)
APPEARANCE UR: CLEAR — SIGNIFICANT CHANGE UP
BASOPHILS # BLD AUTO: 0.05 K/UL — SIGNIFICANT CHANGE UP (ref 0–0.2)
BASOPHILS NFR BLD AUTO: 0.9 % — SIGNIFICANT CHANGE UP (ref 0–2)
BILIRUB UR-MCNC: NEGATIVE — SIGNIFICANT CHANGE UP
BLD GP AB SCN SERPL QL: SIGNIFICANT CHANGE UP
BUN SERPL-MCNC: 13 MG/DL — SIGNIFICANT CHANGE UP (ref 7–23)
CALCIUM SERPL-MCNC: 9.4 MG/DL — SIGNIFICANT CHANGE UP (ref 8.5–10.1)
CHLORIDE SERPL-SCNC: 110 MMOL/L — HIGH (ref 96–108)
CO2 SERPL-SCNC: 27 MMOL/L — SIGNIFICANT CHANGE UP (ref 22–31)
COLOR SPEC: YELLOW — SIGNIFICANT CHANGE UP
CREAT SERPL-MCNC: 0.92 MG/DL — SIGNIFICANT CHANGE UP (ref 0.5–1.3)
DIFF PNL FLD: NEGATIVE — SIGNIFICANT CHANGE UP
EGFR: 78 ML/MIN/1.73M2 — SIGNIFICANT CHANGE UP
EOSINOPHIL # BLD AUTO: 0.19 K/UL — SIGNIFICANT CHANGE UP (ref 0–0.5)
EOSINOPHIL NFR BLD AUTO: 3.6 % — SIGNIFICANT CHANGE UP (ref 0–6)
GLUCOSE SERPL-MCNC: 87 MG/DL — SIGNIFICANT CHANGE UP (ref 70–99)
GLUCOSE UR QL: NEGATIVE — SIGNIFICANT CHANGE UP
HCT VFR BLD CALC: 43 % — SIGNIFICANT CHANGE UP (ref 34.5–45)
HGB BLD-MCNC: 14.8 G/DL — SIGNIFICANT CHANGE UP (ref 11.5–15.5)
IMM GRANULOCYTES NFR BLD AUTO: 0.4 % — SIGNIFICANT CHANGE UP (ref 0–0.9)
INR BLD: 1.04 RATIO — SIGNIFICANT CHANGE UP (ref 0.88–1.16)
KETONES UR-MCNC: NEGATIVE — SIGNIFICANT CHANGE UP
LEUKOCYTE ESTERASE UR-ACNC: NEGATIVE — SIGNIFICANT CHANGE UP
LYMPHOCYTES # BLD AUTO: 1.68 K/UL — SIGNIFICANT CHANGE UP (ref 1–3.3)
LYMPHOCYTES # BLD AUTO: 31.8 % — SIGNIFICANT CHANGE UP (ref 13–44)
MCHC RBC-ENTMCNC: 31.6 PG — SIGNIFICANT CHANGE UP (ref 27–34)
MCHC RBC-ENTMCNC: 34.4 GM/DL — SIGNIFICANT CHANGE UP (ref 32–36)
MCV RBC AUTO: 91.9 FL — SIGNIFICANT CHANGE UP (ref 80–100)
MONOCYTES # BLD AUTO: 0.36 K/UL — SIGNIFICANT CHANGE UP (ref 0–0.9)
MONOCYTES NFR BLD AUTO: 6.8 % — SIGNIFICANT CHANGE UP (ref 2–14)
NEUTROPHILS # BLD AUTO: 2.99 K/UL — SIGNIFICANT CHANGE UP (ref 1.8–7.4)
NEUTROPHILS NFR BLD AUTO: 56.5 % — SIGNIFICANT CHANGE UP (ref 43–77)
NITRITE UR-MCNC: NEGATIVE — SIGNIFICANT CHANGE UP
PH UR: 8 — SIGNIFICANT CHANGE UP (ref 5–8)
PLATELET # BLD AUTO: 329 K/UL — SIGNIFICANT CHANGE UP (ref 150–400)
POTASSIUM SERPL-MCNC: 4 MMOL/L — SIGNIFICANT CHANGE UP (ref 3.5–5.3)
POTASSIUM SERPL-SCNC: 4 MMOL/L — SIGNIFICANT CHANGE UP (ref 3.5–5.3)
PROT UR-MCNC: NEGATIVE — SIGNIFICANT CHANGE UP
PROTHROM AB SERPL-ACNC: 12.1 SEC — SIGNIFICANT CHANGE UP (ref 10.5–13.4)
RBC # BLD: 4.68 M/UL — SIGNIFICANT CHANGE UP (ref 3.8–5.2)
RBC # FLD: 12.4 % — SIGNIFICANT CHANGE UP (ref 10.3–14.5)
SODIUM SERPL-SCNC: 140 MMOL/L — SIGNIFICANT CHANGE UP (ref 135–145)
SP GR SPEC: 1.01 — SIGNIFICANT CHANGE UP (ref 1.01–1.02)
UROBILINOGEN FLD QL: NEGATIVE — SIGNIFICANT CHANGE UP
WBC # BLD: 5.29 K/UL — SIGNIFICANT CHANGE UP (ref 3.8–10.5)
WBC # FLD AUTO: 5.29 K/UL — SIGNIFICANT CHANGE UP (ref 3.8–10.5)

## 2023-05-11 PROCEDURE — 80048 BASIC METABOLIC PNL TOTAL CA: CPT

## 2023-05-11 PROCEDURE — 93010 ELECTROCARDIOGRAM REPORT: CPT

## 2023-05-11 PROCEDURE — 86901 BLOOD TYPING SEROLOGIC RH(D): CPT

## 2023-05-11 PROCEDURE — 86850 RBC ANTIBODY SCREEN: CPT

## 2023-05-11 PROCEDURE — 85025 COMPLETE CBC W/AUTO DIFF WBC: CPT

## 2023-05-11 PROCEDURE — 99214 OFFICE O/P EST MOD 30 MIN: CPT | Mod: 25

## 2023-05-11 PROCEDURE — 71046 X-RAY EXAM CHEST 2 VIEWS: CPT | Mod: 26

## 2023-05-11 PROCEDURE — 81003 URINALYSIS AUTO W/O SCOPE: CPT

## 2023-05-11 PROCEDURE — 93005 ELECTROCARDIOGRAM TRACING: CPT

## 2023-05-11 PROCEDURE — 71046 X-RAY EXAM CHEST 2 VIEWS: CPT

## 2023-05-11 PROCEDURE — 85610 PROTHROMBIN TIME: CPT

## 2023-05-11 PROCEDURE — 36415 COLL VENOUS BLD VENIPUNCTURE: CPT

## 2023-05-11 PROCEDURE — 86900 BLOOD TYPING SEROLOGIC ABO: CPT

## 2023-05-11 RX ORDER — AMLODIPINE BESYLATE 2.5 MG/1
1 TABLET ORAL
Qty: 0 | Refills: 0 | DISCHARGE

## 2023-05-11 RX ORDER — RIVAROXABAN 15 MG-20MG
1 KIT ORAL
Qty: 0 | Refills: 0 | DISCHARGE

## 2023-05-11 RX ORDER — EXEMESTANE 25 MG/1
1 TABLET, SUGAR COATED ORAL
Qty: 0 | Refills: 0 | DISCHARGE

## 2023-05-11 NOTE — H&P PST ADULT - HISTORY OF PRESENT ILLNESS
48 year old female diagnosed with breast cancer s/p bilateral mastectomy s/p chemo; Pt said she developed DVT on 6/2022 in right internal jugular and brachiocephalic veins and non occlusive right subclavian vein; she was on Xarelto x 6 months and port subsequently removed; she presents to PST for planned bilateral revision of reconstructed breast nipple reconstruction

## 2023-05-11 NOTE — H&P PST ADULT - ASSESSMENT
Plan:  1. PST instructions given ; NPO status/  instructions to be given by ASU   2. Pt instructed to take following meds on day of surgery:   3. Pt instructed to take routine evening medications unless indicated   4. Stop NSAIDS ( Aspirin Alev Motrin Mobic Diclofenac), herbal supplements , MVI , Vitamin fish oil 7 days prior to surgery  unless   directed by surgeon or cardiologist;   5. Medical Optimization  with    6. EZ wash instructions given & mupirocin instructions given  7. Labs EKG CXR as per surgeon request   8. Pt denies covid symptoms shortness of breath fever cough   9. Instructed Patient to schedule Covid test 3-5 days prior to surgery    48 year old female diagnosed with breast cancer s/p bilateral mastectomy s/p chemo; Pt said she developed DVT on 6/2022 in right internal jugular and brachiocephalic veins and non occlusive right subclavian vein; she was on Xarelto x 6 months and port subsequently removed; she presents to PST for planned bilateral revision of reconstructed breast nipple reconstruction       Plan:  1. PST instructions given ; NPO status/  instructions to be given by ASU   2. Pt instructed to take following meds on day of surgery: none  3. Pt instructed to take routine evening medications unless indicated   4. Stop NSAIDS ( Aspirin Alev Motrin Mobic Diclofenac), herbal supplements , MVI , Vitamin fish oil 7 days prior to surgery  unless   directed by surgeon or cardiologist;   5. Medical Optimization  with Dr Ayala   6. EZ wash instructions given   7. Labs EKG CXR as per surgeon request   8. Pt denies covid symptoms shortness of breath fever cough

## 2023-05-11 NOTE — H&P PST ADULT - NSICDXPASTMEDICALHX_GEN_ALL_CORE_FT
PAST MEDICAL HISTORY:  Breast cancer, left     Ductal carcinoma in situ (DCIS) of left breast     History of cancer chemotherapy     History of DVT (deep vein thrombosis)     HTN (hypertension)

## 2023-05-11 NOTE — H&P PST ADULT - NSICDXPASTSURGICALHX_GEN_ALL_CORE_FT
PAST SURGICAL HISTORY:  H/O breast biopsy     History of bilateral salpingo-oophorectomy (BSO)     History of left total mastectomy     History of removal of Port-a-Cath     History of right total mastectomy     S/P breast reconstruction, bilateral     [place] teeth extracted

## 2023-05-12 DIAGNOSIS — Z01.818 ENCOUNTER FOR OTHER PREPROCEDURAL EXAMINATION: ICD-10-CM

## 2023-05-12 DIAGNOSIS — C50.912 MALIGNANT NEOPLASM OF UNSPECIFIED SITE OF LEFT FEMALE BREAST: ICD-10-CM

## 2023-05-17 PROBLEM — Z92.21 PERSONAL HISTORY OF ANTINEOPLASTIC CHEMOTHERAPY: Chronic | Status: ACTIVE | Noted: 2023-05-11

## 2023-05-17 PROBLEM — Z86.718 PERSONAL HISTORY OF OTHER VENOUS THROMBOSIS AND EMBOLISM: Chronic | Status: ACTIVE | Noted: 2023-05-11

## 2023-05-24 ENCOUNTER — TRANSCRIPTION ENCOUNTER (OUTPATIENT)
Age: 48
End: 2023-05-24

## 2023-05-24 ENCOUNTER — APPOINTMENT (OUTPATIENT)
Dept: PLASTIC SURGERY | Facility: HOSPITAL | Age: 48
End: 2023-05-24
Payer: COMMERCIAL

## 2023-05-24 ENCOUNTER — RESULT REVIEW (OUTPATIENT)
Age: 48
End: 2023-05-24

## 2023-05-24 ENCOUNTER — OUTPATIENT (OUTPATIENT)
Dept: INPATIENT UNIT | Facility: HOSPITAL | Age: 48
LOS: 1 days | Discharge: HOME | End: 2023-05-24
Payer: COMMERCIAL

## 2023-05-24 VITALS
SYSTOLIC BLOOD PRESSURE: 140 MMHG | HEART RATE: 77 BPM | WEIGHT: 199.96 LBS | OXYGEN SATURATION: 100 % | HEIGHT: 68 IN | TEMPERATURE: 98 F | RESPIRATION RATE: 16 BRPM | DIASTOLIC BLOOD PRESSURE: 94 MMHG

## 2023-05-24 VITALS
RESPIRATION RATE: 18 BRPM | TEMPERATURE: 98 F | HEART RATE: 66 BPM | DIASTOLIC BLOOD PRESSURE: 84 MMHG | SYSTOLIC BLOOD PRESSURE: 118 MMHG | OXYGEN SATURATION: 98 %

## 2023-05-24 DIAGNOSIS — Z90.722 ACQUIRED ABSENCE OF OVARIES, BILATERAL: Chronic | ICD-10-CM

## 2023-05-24 DIAGNOSIS — C50.912 MALIGNANT NEOPLASM OF UNSPECIFIED SITE OF LEFT FEMALE BREAST: ICD-10-CM

## 2023-05-24 DIAGNOSIS — Z98.890 OTHER SPECIFIED POSTPROCEDURAL STATES: ICD-10-CM

## 2023-05-24 DIAGNOSIS — Z90.12 ACQUIRED ABSENCE OF LEFT BREAST AND NIPPLE: Chronic | ICD-10-CM

## 2023-05-24 DIAGNOSIS — Z98.890 OTHER SPECIFIED POSTPROCEDURAL STATES: Chronic | ICD-10-CM

## 2023-05-24 DIAGNOSIS — K08.409 PARTIAL LOSS OF TEETH, UNSPECIFIED CAUSE, UNSPECIFIED CLASS: Chronic | ICD-10-CM

## 2023-05-24 DIAGNOSIS — Z90.11 ACQUIRED ABSENCE OF RIGHT BREAST AND NIPPLE: Chronic | ICD-10-CM

## 2023-05-24 LAB — HCG UR QL: NEGATIVE — SIGNIFICANT CHANGE UP

## 2023-05-24 PROCEDURE — 81025 URINE PREGNANCY TEST: CPT

## 2023-05-24 PROCEDURE — 88304 TISSUE EXAM BY PATHOLOGIST: CPT | Mod: 26

## 2023-05-24 PROCEDURE — 15877 SUCTION LIPECTOMY TRUNK: CPT

## 2023-05-24 PROCEDURE — 14302 TIS TRNFR ADDL 30 SQ CM: CPT

## 2023-05-24 PROCEDURE — 14301 TIS TRNFR ANY 30.1-60 SQ CM: CPT

## 2023-05-24 PROCEDURE — 19350 NIPPLE/AREOLA RECONSTRUCTION: CPT | Mod: 50

## 2023-05-24 PROCEDURE — C9399: CPT

## 2023-05-24 PROCEDURE — 88304 TISSUE EXAM BY PATHOLOGIST: CPT

## 2023-05-24 RX ORDER — SODIUM CHLORIDE 9 MG/ML
1000 INJECTION, SOLUTION INTRAVENOUS
Refills: 0 | Status: DISCONTINUED | OUTPATIENT
Start: 2023-05-24 | End: 2023-05-24

## 2023-05-24 RX ORDER — OXYCODONE HYDROCHLORIDE 5 MG/1
5 TABLET ORAL ONCE
Refills: 0 | Status: DISCONTINUED | OUTPATIENT
Start: 2023-05-24 | End: 2023-05-24

## 2023-05-24 RX ORDER — FENTANYL CITRATE 50 UG/ML
50 INJECTION INTRAVENOUS
Refills: 0 | Status: DISCONTINUED | OUTPATIENT
Start: 2023-05-24 | End: 2023-05-24

## 2023-05-24 RX ORDER — TRAMADOL HYDROCHLORIDE 50 MG/1
50 TABLET ORAL ONCE
Refills: 0 | Status: DISCONTINUED | OUTPATIENT
Start: 2023-05-24 | End: 2023-05-24

## 2023-05-24 RX ORDER — EXEMESTANE 25 MG/1
1 TABLET, SUGAR COATED ORAL
Refills: 0 | DISCHARGE

## 2023-05-24 RX ORDER — CHOLECALCIFEROL (VITAMIN D3) 125 MCG
1 CAPSULE ORAL
Qty: 0 | Refills: 0 | DISCHARGE

## 2023-05-24 RX ORDER — AMLODIPINE BESYLATE 2.5 MG/1
1 TABLET ORAL
Refills: 0 | DISCHARGE

## 2023-05-24 RX ORDER — GLUCOSAMINE/CHONDROITIN/C/MANG 500-400 MG
3 CAPSULE ORAL
Refills: 0 | DISCHARGE

## 2023-05-24 RX ORDER — ONDANSETRON 8 MG/1
4 TABLET, FILM COATED ORAL ONCE
Refills: 0 | Status: DISCONTINUED | OUTPATIENT
Start: 2023-05-24 | End: 2023-05-24

## 2023-05-24 RX ADMIN — TRAMADOL HYDROCHLORIDE 50 MILLIGRAM(S): 50 TABLET ORAL at 15:30

## 2023-05-24 RX ADMIN — TRAMADOL HYDROCHLORIDE 50 MILLIGRAM(S): 50 TABLET ORAL at 15:41

## 2023-05-24 NOTE — ASU PATIENT PROFILE, ADULT - NSICDXPASTSURGICALHX_GEN_ALL_CORE_FT
PAST SURGICAL HISTORY:  H/O breast biopsy     History of bilateral salpingo-oophorectomy (BSO)     History of left total mastectomy     History of removal of Port-a-Cath     History of right total mastectomy     S/P breast reconstruction, bilateral     [place] teeth extracted

## 2023-05-24 NOTE — BRIEF OPERATIVE NOTE - NSICDXBRIEFPOSTOP_GEN_ALL_CORE_FT
POST-OP DIAGNOSIS:  Deformity and disproportion of reconstructed breast 24-May-2023 14:21:52  Patricia Galvan

## 2023-05-24 NOTE — ASU DISCHARGE PLAN (ADULT/PEDIATRIC) - ASU DC SPECIAL INSTRUCTIONSFT
Dressings: Leave dressings intact and dry for 48 hours, after 48 hours you may remove the outer gauze only. Leave the surgical glue tape or steri strips intact, they will fall off on their own.     Bathing: Do not get dressings wet for 48 hours.  Once outer dressings are removed you may shower, do not scrub directly on the incisions. No dressing is required after shower, pat the area dry gently. Replace surgical bra when finished.    Drains: Empty and record the drain amount from each drain separately. Write down Date, Time, and Amount for each drain separately on a piece of paper and bring the paper to the office at your follow up visit.  Please make sure that after draining you squeeze the bulb prior to capping to make sure the drain is on constant suction.  The bulb should appear flat at all times.     Activity: No heavy lifting, or strenuous activity, you may walk for exercise    Diet: No change    Meds: For the next 48 hours alternate taking 1000mg of Tylenol (acetaminophen) and 400 mg of Ibuprofen every 3 hours.  After the 48 hours you can alternate as needed. Take prescribed Oxycodone for severe breakthrough pain only.  Do not exceed 4000mg of Tylenol or 1200mg of ibuprofen in a 24 hour period.    Follow up: call the office to confirm a follow up appointment in 1 week Dressings: Leave dressings intact and dry for 48 hours, after 48 hours you may remove the outer clear tape and gauze only. Leave the steri strips intact, they will fall off on their own.  DO NOT DISCARD THE BLUE NIPPLE COVERS. After the shower, pat area dry gently and apply a thin layer of bacitracin to inside of the blue nipple cover and place over the reconstructed nipple. Secure cover in place with a small piece of tape and replace surgical bra.  Change nipple dressings daily.  Leave steri strips over hips and right breast incision in place they will fall off on their own.     Bathing: Do not get dressings wet for 48 hours.  Once outer dressings are removed you may shower, do not scrub directly on the incisions. SAVE THE BLUE NIPPLE COVERS. Pat the area dry gently when you are finished.     Activity: No heavy lifting, or strenuous activity, you may walk for exercise    Diet: No change    Meds: For the next 48 hours alternate taking 650 mg of Tylenol (acetaminophen) and 400 mg of Ibuprofen every 3 hours.  After the 48 hours you can alternate as needed. Take prescribed Oxycodone for severe breakthrough pain only.  Do not exceed 4000mg of Tylenol or 1200mg of ibuprofen in a 24 hour period.   Take prescribed antibiotic for the next 3 days     Follow up: call the office to confirm a follow up appointment in 1 week

## 2023-05-24 NOTE — BRIEF OPERATIVE NOTE - NSICDXBRIEFPREOP_GEN_ALL_CORE_FT
PRE-OP DIAGNOSIS:  Deformity and disproportion of reconstructed breast 24-May-2023 14:21:41  Patricia Galvan

## 2023-05-24 NOTE — ASU DISCHARGE PLAN (ADULT/PEDIATRIC) - NS MD DC FALL RISK RISK
For information on Fall & Injury Prevention, visit: [email or web address] OR  [email or web address] OR  https://www.cdc.gov/steadi/patient.html

## 2023-05-30 ENCOUNTER — APPOINTMENT (OUTPATIENT)
Dept: PLASTIC SURGERY | Facility: CLINIC | Age: 48
End: 2023-05-30
Payer: COMMERCIAL

## 2023-05-30 VITALS
SYSTOLIC BLOOD PRESSURE: 131 MMHG | HEIGHT: 68 IN | BODY MASS INDEX: 29.86 KG/M2 | HEART RATE: 82 BPM | DIASTOLIC BLOOD PRESSURE: 86 MMHG | WEIGHT: 197 LBS | TEMPERATURE: 97.3 F | RESPIRATION RATE: 14 BRPM

## 2023-05-30 DIAGNOSIS — N65.0 DEFORMITY OF RECONSTRUCTED BREAST: ICD-10-CM

## 2023-05-30 DIAGNOSIS — I10 ESSENTIAL (PRIMARY) HYPERTENSION: ICD-10-CM

## 2023-05-30 DIAGNOSIS — L98.7 EXCESSIVE AND REDUNDANT SKIN AND SUBCUTANEOUS TISSUE: ICD-10-CM

## 2023-05-30 DIAGNOSIS — Z86.000 PERSONAL HISTORY OF IN-SITU NEOPLASM OF BREAST: ICD-10-CM

## 2023-05-30 DIAGNOSIS — L76.82 OTHER POSTPROCEDURAL COMPLICATIONS OF SKIN AND SUBCUTANEOUS TISSUE: ICD-10-CM

## 2023-05-30 DIAGNOSIS — Z85.3 PERSONAL HISTORY OF MALIGNANT NEOPLASM OF BREAST: ICD-10-CM

## 2023-05-30 DIAGNOSIS — N65.1 DISPROPORTION OF RECONSTRUCTED BREAST: ICD-10-CM

## 2023-05-30 DIAGNOSIS — Z88.2 ALLERGY STATUS TO SULFONAMIDES: ICD-10-CM

## 2023-05-30 DIAGNOSIS — Z92.21 PERSONAL HISTORY OF ANTINEOPLASTIC CHEMOTHERAPY: ICD-10-CM

## 2023-05-30 DIAGNOSIS — Z86.718 PERSONAL HISTORY OF OTHER VENOUS THROMBOSIS AND EMBOLISM: ICD-10-CM

## 2023-05-30 DIAGNOSIS — Z90.13 ACQUIRED ABSENCE OF BILATERAL BREASTS AND NIPPLES: ICD-10-CM

## 2023-05-30 DIAGNOSIS — Z90.722 ACQUIRED ABSENCE OF OVARIES, BILATERAL: ICD-10-CM

## 2023-05-30 DIAGNOSIS — L90.5 SCAR CONDITIONS AND FIBROSIS OF SKIN: ICD-10-CM

## 2023-05-30 LAB — SURGICAL PATHOLOGY STUDY: SIGNIFICANT CHANGE UP

## 2023-05-30 PROCEDURE — 99024 POSTOP FOLLOW-UP VISIT: CPT

## 2023-05-30 NOTE — PHYSICAL EXAM
[NI] : Normal [de-identified] : b/l breast flaps soft and symmetrical\par skin paddles viable \par incisions c/d/i\par no signs of infection \par no palpable fluid collections\par b/l nipple reconstruction with good projection [de-identified] : abdomen soft and non tender\par umbilicus viable\par incisions c/d/i\par healing ecchymosis b/l  [de-identified] : upper chest incision c/d/i\par surrounding contact dermatitis rash, no signs of infection or palpable fluid collections

## 2023-05-30 NOTE — HISTORY OF PRESENT ILLNESS
[FreeTextEntry1] : Ms. NIKKO KOVACS  is a 46 year  old female  with no pertinent past medical history who presents with newly diagnosed left breast cancer which was found on self exam in November.  Pt had mammogram 1/11/22, and later confirmed IDC with ultrasound guided core biopsy 1/13/22  pt was referred to the office by Dr Mederos  \par \par pt is now s/p b/l skin sparing mastectomy with reconstruction using AMINA flap and alloderm 3/14/22\par pt doing well\par Denies fever, chills, LE pain/edema\par \par chemo 5/18 - end of August\par she was found to have a DVT and finished Xeralto 11/2022\par \par now s/p revision of reconstructed breasts including chemo port scar revision, b/l nipple reconstruction, right breast vertical scar revision, b/l dog ear revisions 5/24/23\par she admits to a rash near her chemo port revision, she tried putting hydrocortisone cream on the area but she admits to burning so she stopped. \par Denies fever, chills, LE pain/edema\par has been doing daily dressing changes

## 2023-05-30 NOTE — ASSESSMENT
[FreeTextEntry1] : 47 yo female s/p revision of reconstructed breasts 5/24/23\par doing ok\par steroid cream prescribed \par continue daily dressing changes to nipples\par monitor for redness, swelling, fever, chills\par no heavy lifting or strenuous activity\par continue to wear soft, non wire bra\par she is encouraged to call if there are any changes\par all pt questions answered\par

## 2023-06-13 ENCOUNTER — APPOINTMENT (OUTPATIENT)
Dept: PLASTIC SURGERY | Facility: CLINIC | Age: 48
End: 2023-06-13
Payer: COMMERCIAL

## 2023-06-13 VITALS
RESPIRATION RATE: 14 BRPM | WEIGHT: 203 LBS | BODY MASS INDEX: 30.77 KG/M2 | HEART RATE: 66 BPM | SYSTOLIC BLOOD PRESSURE: 110 MMHG | OXYGEN SATURATION: 98 % | TEMPERATURE: 98 F | DIASTOLIC BLOOD PRESSURE: 74 MMHG | HEIGHT: 68 IN

## 2023-06-13 PROCEDURE — 99024 POSTOP FOLLOW-UP VISIT: CPT

## 2023-06-13 NOTE — PHYSICAL EXAM
[NI] : Normal [de-identified] : b/l breast flaps soft and symmetrical\par skin paddles viable \par incisions c/d/i\par no signs of infection \par no palpable fluid collections\par b/l nipple reconstruction with good projection [de-identified] : abdomen soft and non tender\par umbilicus viable\par incisions c/d/i [de-identified] : upper chest incision c/d/i\par surrounding contact dermatitis rash has resolved, no signs of infection or palpable fluid collections

## 2023-06-13 NOTE — HISTORY OF PRESENT ILLNESS
Spoke with Gumaro Chua at [facility], patient covered at 100% for Nexplanon, insertion and removal [FreeTextEntry1] : Ms. NIKKO KOVACS  is a 46 year  old female  with no pertinent past medical history who presents with newly diagnosed left breast cancer which was found on self exam in November.  Pt had mammogram 1/11/22, and later confirmed IDC with ultrasound guided core biopsy 1/13/22  pt was referred to the office by Dr Mederos  \par \par pt is now s/p b/l skin sparing mastectomy with reconstruction using AMINA flap and alloderm 3/14/22\par pt doing well\par Denies fever, chills, LE pain/edema\par \par chemo 5/18 - end of August\par she was found to have a DVT and finished Xeralto 11/2022\par \par now s/p revision of reconstructed breasts including chemo port scar revision, b/l nipple reconstruction, right breast vertical scar revision, b/l dog ear revisions 5/24/23\par contact dermatitis resolved\par denies fever, chills, LE pain/edema\par has been doing daily dressing changes with nipple covers

## 2023-06-13 NOTE — ASSESSMENT
[FreeTextEntry1] : S/P breast reconstruction, bilateral (V43.82) (Z98.890)\par \par 47 yo female s/p revision of reconstructed breasts 5/24/23\par doing well\par d/c daily dressing changes to nipples\par monitor for redness, swelling, fever, chills\par no heavy lifting or strenuous activity\par continue to wear soft, non wire bra\par she is encouraged to call if there are any changes\par all pt questions answered

## 2023-07-11 ENCOUNTER — APPOINTMENT (OUTPATIENT)
Dept: PLASTIC SURGERY | Facility: CLINIC | Age: 48
End: 2023-07-11
Payer: COMMERCIAL

## 2023-07-11 VITALS
SYSTOLIC BLOOD PRESSURE: 118 MMHG | OXYGEN SATURATION: 96 % | TEMPERATURE: 97.7 F | DIASTOLIC BLOOD PRESSURE: 74 MMHG | BODY MASS INDEX: 30.46 KG/M2 | HEIGHT: 68 IN | WEIGHT: 201 LBS | RESPIRATION RATE: 14 BRPM | HEART RATE: 83 BPM

## 2023-07-11 DIAGNOSIS — L91.0 HYPERTROPHIC SCAR: ICD-10-CM

## 2023-07-11 DIAGNOSIS — Z98.890 OTHER SPECIFIED POSTPROCEDURAL STATES: ICD-10-CM

## 2023-07-11 PROCEDURE — 99024 POSTOP FOLLOW-UP VISIT: CPT

## 2023-07-11 NOTE — PHYSICAL EXAM
[NI] : Normal [de-identified] : b/l breast flaps soft and symmetrical\par skin paddles viable \par incisions c/d/i\par no signs of infection \par no palpable fluid collections\par b/l nipple reconstruction with good projection [de-identified] : abdomen soft and non tender\par umbilicus viable\par incisions c/d/i [de-identified] : upper chest incision with hypertrophy and widening scar\par

## 2023-07-11 NOTE — HISTORY OF PRESENT ILLNESS
[FreeTextEntry1] : Ms. NIKKO KOVACS  is a 46 year  old female  with no pertinent past medical history who presents with newly diagnosed left breast cancer which was found on self exam in November.  Pt had mammogram 1/11/22, and later confirmed IDC with ultrasound guided core biopsy 1/13/22  pt was referred to the office by Dr Mederos  \par \par pt is now s/p b/l skin sparing mastectomy with reconstruction using AMINA flap and alloderm 3/14/22\par pt doing well\par Denies fever, chills, LE pain/edema\par \par chemo 5/18 - end of August\par she was found to have a DVT and finished Xeralto 11/2022\par \par now s/p revision of reconstructed breasts including chemo port scar revision, b/l nipple reconstruction, right breast vertical scar revision, b/l dog ear revisions 5/24/23\par denies fever, chills, LE pain/edema

## 2023-08-28 NOTE — ASU DISCHARGE PLAN (ADULT/PEDIATRIC) - CARE PROVIDER_API CALL
Continue atorvastatin   Aren Ramirez)  Plastic Surgery; Surgery; Surgery of the Hand  [address], [address]  [place] [address]  Phone: [phone]  Fax: [phone]  Scheduled Appointment: 05/30/2023

## 2023-09-20 ENCOUNTER — RX RENEWAL (OUTPATIENT)
Age: 48
End: 2023-09-20

## 2023-09-26 ENCOUNTER — OUTPATIENT (OUTPATIENT)
Dept: OUTPATIENT SERVICES | Facility: HOSPITAL | Age: 48
LOS: 1 days | Discharge: HOME | End: 2023-09-26

## 2023-09-26 DIAGNOSIS — Z90.11 ACQUIRED ABSENCE OF RIGHT BREAST AND NIPPLE: Chronic | ICD-10-CM

## 2023-09-26 DIAGNOSIS — K08.409 PARTIAL LOSS OF TEETH, UNSPECIFIED CAUSE, UNSPECIFIED CLASS: Chronic | ICD-10-CM

## 2023-09-26 DIAGNOSIS — Z90.722 ACQUIRED ABSENCE OF OVARIES, BILATERAL: Chronic | ICD-10-CM

## 2023-09-26 DIAGNOSIS — Z98.890 OTHER SPECIFIED POSTPROCEDURAL STATES: Chronic | ICD-10-CM

## 2023-09-26 DIAGNOSIS — Z90.12 ACQUIRED ABSENCE OF LEFT BREAST AND NIPPLE: Chronic | ICD-10-CM

## 2023-09-26 DIAGNOSIS — C50.919 MALIGNANT NEOPLASM OF UNSPECIFIED SITE OF UNSPECIFIED FEMALE BREAST: ICD-10-CM

## 2023-10-05 ENCOUNTER — RESULT REVIEW (OUTPATIENT)
Age: 48
End: 2023-10-05

## 2023-10-05 ENCOUNTER — APPOINTMENT (OUTPATIENT)
Dept: INFUSION THERAPY | Facility: HOSPITAL | Age: 48
End: 2023-10-05

## 2023-10-05 ENCOUNTER — NON-APPOINTMENT (OUTPATIENT)
Age: 48
End: 2023-10-05

## 2023-10-05 ENCOUNTER — APPOINTMENT (OUTPATIENT)
Dept: HEMATOLOGY ONCOLOGY | Facility: CLINIC | Age: 48
End: 2023-10-05
Payer: COMMERCIAL

## 2023-10-05 VITALS
HEART RATE: 66 BPM | DIASTOLIC BLOOD PRESSURE: 86 MMHG | OXYGEN SATURATION: 99 % | TEMPERATURE: 97.2 F | SYSTOLIC BLOOD PRESSURE: 128 MMHG | WEIGHT: 201.72 LBS | BODY MASS INDEX: 30.67 KG/M2 | RESPIRATION RATE: 12 BRPM

## 2023-10-05 DIAGNOSIS — Z00.00 ENCOUNTER FOR GENERAL ADULT MEDICAL EXAMINATION W/OUT ABNORMAL FINDINGS: ICD-10-CM

## 2023-10-05 LAB
ALBUMIN SERPL ELPH-MCNC: 4.7 G/DL — SIGNIFICANT CHANGE UP (ref 3.3–5)
ALP SERPL-CCNC: 119 U/L — SIGNIFICANT CHANGE UP (ref 40–120)
ALT FLD-CCNC: 6 U/L — LOW (ref 10–45)
ANION GAP SERPL CALC-SCNC: 13 MMOL/L — SIGNIFICANT CHANGE UP (ref 5–17)
AST SERPL-CCNC: 22 U/L — SIGNIFICANT CHANGE UP (ref 10–40)
BASOPHILS # BLD AUTO: 0.06 K/UL — SIGNIFICANT CHANGE UP (ref 0–0.2)
BASOPHILS NFR BLD AUTO: 0.9 % — SIGNIFICANT CHANGE UP (ref 0–2)
BILIRUB SERPL-MCNC: 0.3 MG/DL — SIGNIFICANT CHANGE UP (ref 0.2–1.2)
BUN SERPL-MCNC: 12 MG/DL — SIGNIFICANT CHANGE UP (ref 7–23)
CALCIUM SERPL-MCNC: 9.9 MG/DL — SIGNIFICANT CHANGE UP (ref 8.4–10.5)
CHLORIDE SERPL-SCNC: 104 MMOL/L — SIGNIFICANT CHANGE UP (ref 96–108)
CO2 SERPL-SCNC: 23 MMOL/L — SIGNIFICANT CHANGE UP (ref 22–31)
CREAT SERPL-MCNC: 0.83 MG/DL — SIGNIFICANT CHANGE UP (ref 0.5–1.3)
EGFR: 88 ML/MIN/1.73M2 — SIGNIFICANT CHANGE UP
EOSINOPHIL # BLD AUTO: 0.27 K/UL — SIGNIFICANT CHANGE UP (ref 0–0.5)
EOSINOPHIL NFR BLD AUTO: 4 % — SIGNIFICANT CHANGE UP (ref 0–6)
GLUCOSE SERPL-MCNC: 90 MG/DL — SIGNIFICANT CHANGE UP (ref 70–99)
HCT VFR BLD CALC: 43.1 % — SIGNIFICANT CHANGE UP (ref 34.5–45)
HGB BLD-MCNC: 14.5 G/DL — SIGNIFICANT CHANGE UP (ref 11.5–15.5)
IMM GRANULOCYTES NFR BLD AUTO: 1.3 % — HIGH (ref 0–0.9)
LYMPHOCYTES # BLD AUTO: 1.89 K/UL — SIGNIFICANT CHANGE UP (ref 1–3.3)
LYMPHOCYTES # BLD AUTO: 28.3 % — SIGNIFICANT CHANGE UP (ref 13–44)
MCHC RBC-ENTMCNC: 30.7 PG — SIGNIFICANT CHANGE UP (ref 27–34)
MCHC RBC-ENTMCNC: 33.6 G/DL — SIGNIFICANT CHANGE UP (ref 32–36)
MCV RBC AUTO: 91.3 FL — SIGNIFICANT CHANGE UP (ref 80–100)
MONOCYTES # BLD AUTO: 0.52 K/UL — SIGNIFICANT CHANGE UP (ref 0–0.9)
MONOCYTES NFR BLD AUTO: 7.8 % — SIGNIFICANT CHANGE UP (ref 2–14)
NEUTROPHILS # BLD AUTO: 3.86 K/UL — SIGNIFICANT CHANGE UP (ref 1.8–7.4)
NEUTROPHILS NFR BLD AUTO: 57.7 % — SIGNIFICANT CHANGE UP (ref 43–77)
NRBC # BLD: 0 /100 WBCS — SIGNIFICANT CHANGE UP (ref 0–0)
PLATELET # BLD AUTO: 308 K/UL — SIGNIFICANT CHANGE UP (ref 150–400)
POTASSIUM SERPL-MCNC: 4 MMOL/L — SIGNIFICANT CHANGE UP (ref 3.5–5.3)
POTASSIUM SERPL-SCNC: 4 MMOL/L — SIGNIFICANT CHANGE UP (ref 3.5–5.3)
PROT SERPL-MCNC: 7.3 G/DL — SIGNIFICANT CHANGE UP (ref 6–8.3)
RBC # BLD: 4.72 M/UL — SIGNIFICANT CHANGE UP (ref 3.8–5.2)
RBC # FLD: 12.2 % — SIGNIFICANT CHANGE UP (ref 10.3–14.5)
SODIUM SERPL-SCNC: 139 MMOL/L — SIGNIFICANT CHANGE UP (ref 135–145)
WBC # BLD: 6.69 K/UL — SIGNIFICANT CHANGE UP (ref 3.8–10.5)
WBC # FLD AUTO: 6.69 K/UL — SIGNIFICANT CHANGE UP (ref 3.8–10.5)

## 2023-10-05 PROCEDURE — 99214 OFFICE O/P EST MOD 30 MIN: CPT

## 2023-10-09 ENCOUNTER — OUTPATIENT (OUTPATIENT)
Dept: OUTPATIENT SERVICES | Facility: HOSPITAL | Age: 48
LOS: 1 days | End: 2023-10-09
Payer: COMMERCIAL

## 2023-10-09 ENCOUNTER — APPOINTMENT (OUTPATIENT)
Dept: RADIOLOGY | Facility: CLINIC | Age: 48
End: 2023-10-09
Payer: COMMERCIAL

## 2023-10-09 DIAGNOSIS — Z90.11 ACQUIRED ABSENCE OF RIGHT BREAST AND NIPPLE: Chronic | ICD-10-CM

## 2023-10-09 DIAGNOSIS — Z90.12 ACQUIRED ABSENCE OF LEFT BREAST AND NIPPLE: Chronic | ICD-10-CM

## 2023-10-09 DIAGNOSIS — Z98.890 OTHER SPECIFIED POSTPROCEDURAL STATES: Chronic | ICD-10-CM

## 2023-10-09 DIAGNOSIS — Z00.00 ENCOUNTER FOR GENERAL ADULT MEDICAL EXAMINATION WITHOUT ABNORMAL FINDINGS: ICD-10-CM

## 2023-10-09 DIAGNOSIS — K08.409 PARTIAL LOSS OF TEETH, UNSPECIFIED CAUSE, UNSPECIFIED CLASS: Chronic | ICD-10-CM

## 2023-10-09 DIAGNOSIS — Z90.722 ACQUIRED ABSENCE OF OVARIES, BILATERAL: Chronic | ICD-10-CM

## 2023-10-09 PROCEDURE — 77080 DXA BONE DENSITY AXIAL: CPT | Mod: 26

## 2023-10-09 PROCEDURE — 77080 DXA BONE DENSITY AXIAL: CPT

## 2023-10-16 ENCOUNTER — APPOINTMENT (OUTPATIENT)
Dept: GASTROENTEROLOGY | Facility: CLINIC | Age: 48
End: 2023-10-16
Payer: COMMERCIAL

## 2023-10-16 VITALS
HEIGHT: 68 IN | HEART RATE: 100 BPM | SYSTOLIC BLOOD PRESSURE: 124 MMHG | DIASTOLIC BLOOD PRESSURE: 81 MMHG | WEIGHT: 200 LBS | BODY MASS INDEX: 30.31 KG/M2 | OXYGEN SATURATION: 98 %

## 2023-10-16 DIAGNOSIS — Z12.11 ENCOUNTER FOR SCREENING FOR MALIGNANT NEOPLASM OF COLON: ICD-10-CM

## 2023-10-16 PROCEDURE — 99203 OFFICE O/P NEW LOW 30 MIN: CPT

## 2023-11-02 ENCOUNTER — APPOINTMENT (OUTPATIENT)
Dept: BREAST CENTER | Facility: CLINIC | Age: 48
End: 2023-11-02
Payer: COMMERCIAL

## 2023-11-02 VITALS
WEIGHT: 200 LBS | DIASTOLIC BLOOD PRESSURE: 90 MMHG | HEART RATE: 77 BPM | BODY MASS INDEX: 30.31 KG/M2 | HEIGHT: 68 IN | SYSTOLIC BLOOD PRESSURE: 138 MMHG

## 2023-11-02 PROCEDURE — 99214 OFFICE O/P EST MOD 30 MIN: CPT

## 2023-11-02 RX ORDER — BETAMETHASONE DIPROPIONATE 0.5 MG/G
0.05 CREAM TOPICAL TWICE DAILY
Qty: 1 | Refills: 0 | Status: DISCONTINUED | COMMUNITY
Start: 2023-05-30 | End: 2023-11-02

## 2023-11-02 RX ORDER — TRAMADOL HYDROCHLORIDE 50 MG/1
50 TABLET, COATED ORAL
Qty: 10 | Refills: 0 | Status: DISCONTINUED | COMMUNITY
Start: 2023-05-18 | End: 2023-11-02

## 2023-11-02 RX ORDER — OXYCODONE 5 MG/1
5 TABLET ORAL
Qty: 10 | Refills: 0 | Status: DISCONTINUED | COMMUNITY
Start: 2023-05-17 | End: 2023-11-02

## 2023-11-02 RX ORDER — CEFADROXIL 500 MG/1
500 CAPSULE ORAL
Qty: 6 | Refills: 0 | Status: DISCONTINUED | COMMUNITY
Start: 2023-05-17 | End: 2023-11-02

## 2023-11-06 NOTE — ASU PATIENT PROFILE, ADULT - FALL HARM RISK - UNIVERSAL INTERVENTIONS
Bed in lowest position, wheels locked, appropriate side rails in place/Call bell, personal items and telephone in reach/Instruct patient to call for assistance before getting out of bed or chair/Non-slip footwear when patient is out of bed/[place] to call system/Physically safe environment - no spills, clutter or unnecessary equipment/Purposeful Proactive Rounding/Room/bathroom lighting operational, light cord in reach
205

## 2023-11-14 ENCOUNTER — APPOINTMENT (OUTPATIENT)
Dept: GASTROENTEROLOGY | Facility: AMBULATORY MEDICAL SERVICES | Age: 48
End: 2023-11-14
Payer: COMMERCIAL

## 2023-11-14 PROCEDURE — 45385 COLONOSCOPY W/LESION REMOVAL: CPT | Mod: 33

## 2023-11-14 PROCEDURE — 45381 COLONOSCOPY SUBMUCOUS NJX: CPT | Mod: 33

## 2023-11-22 DIAGNOSIS — D12.6 BENIGN NEOPLASM OF COLON, UNSPECIFIED: ICD-10-CM

## 2024-03-06 ENCOUNTER — OUTPATIENT (OUTPATIENT)
Dept: OUTPATIENT SERVICES | Facility: HOSPITAL | Age: 49
LOS: 1 days | Discharge: HOME | End: 2024-03-06

## 2024-03-06 DIAGNOSIS — Z98.890 OTHER SPECIFIED POSTPROCEDURAL STATES: Chronic | ICD-10-CM

## 2024-03-06 DIAGNOSIS — Z90.722 ACQUIRED ABSENCE OF OVARIES, BILATERAL: Chronic | ICD-10-CM

## 2024-03-06 DIAGNOSIS — Z90.12 ACQUIRED ABSENCE OF LEFT BREAST AND NIPPLE: Chronic | ICD-10-CM

## 2024-03-06 DIAGNOSIS — C50.919 MALIGNANT NEOPLASM OF UNSPECIFIED SITE OF UNSPECIFIED FEMALE BREAST: ICD-10-CM

## 2024-03-06 DIAGNOSIS — Z90.11 ACQUIRED ABSENCE OF RIGHT BREAST AND NIPPLE: Chronic | ICD-10-CM

## 2024-03-06 DIAGNOSIS — K08.409 PARTIAL LOSS OF TEETH, UNSPECIFIED CAUSE, UNSPECIFIED CLASS: Chronic | ICD-10-CM

## 2024-03-11 ENCOUNTER — APPOINTMENT (OUTPATIENT)
Dept: HEMATOLOGY ONCOLOGY | Facility: CLINIC | Age: 49
End: 2024-03-11
Payer: COMMERCIAL

## 2024-03-11 ENCOUNTER — RESULT REVIEW (OUTPATIENT)
Age: 49
End: 2024-03-11

## 2024-03-11 ENCOUNTER — APPOINTMENT (OUTPATIENT)
Dept: INFUSION THERAPY | Facility: HOSPITAL | Age: 49
End: 2024-03-11

## 2024-03-11 VITALS
DIASTOLIC BLOOD PRESSURE: 90 MMHG | BODY MASS INDEX: 30.89 KG/M2 | WEIGHT: 203.13 LBS | TEMPERATURE: 98 F | HEART RATE: 93 BPM | OXYGEN SATURATION: 96 % | SYSTOLIC BLOOD PRESSURE: 124 MMHG | RESPIRATION RATE: 14 BRPM

## 2024-03-11 DIAGNOSIS — G62.0 DRUG-INDUCED POLYNEUROPATHY: ICD-10-CM

## 2024-03-11 DIAGNOSIS — C50.912 MALIGNANT NEOPLASM OF UNSPECIFIED SITE OF LEFT FEMALE BREAST: ICD-10-CM

## 2024-03-11 DIAGNOSIS — T45.1X5A PAIN IN UNSPECIFIED JOINT: ICD-10-CM

## 2024-03-11 DIAGNOSIS — T45.1X5A DRUG-INDUCED POLYNEUROPATHY: ICD-10-CM

## 2024-03-11 DIAGNOSIS — I82.409 ACUTE EMBOLISM AND THROMBOSIS OF UNSPECIFIED DEEP VEINS OF UNSPECIFIED LOWER EXTREMITY: ICD-10-CM

## 2024-03-11 DIAGNOSIS — M25.50 PAIN IN UNSPECIFIED JOINT: ICD-10-CM

## 2024-03-11 LAB
ALBUMIN SERPL ELPH-MCNC: 4.7 G/DL — SIGNIFICANT CHANGE UP (ref 3.3–5)
ALP SERPL-CCNC: 104 U/L — SIGNIFICANT CHANGE UP (ref 40–120)
ALT FLD-CCNC: 12 U/L — SIGNIFICANT CHANGE UP (ref 10–45)
ANION GAP SERPL CALC-SCNC: 10 MMOL/L — SIGNIFICANT CHANGE UP (ref 5–17)
AST SERPL-CCNC: 16 U/L — SIGNIFICANT CHANGE UP (ref 10–40)
BASOPHILS # BLD AUTO: 0.05 K/UL — SIGNIFICANT CHANGE UP (ref 0–0.2)
BASOPHILS NFR BLD AUTO: 0.8 % — SIGNIFICANT CHANGE UP (ref 0–2)
BILIRUB SERPL-MCNC: 0.2 MG/DL — SIGNIFICANT CHANGE UP (ref 0.2–1.2)
BUN SERPL-MCNC: 14 MG/DL — SIGNIFICANT CHANGE UP (ref 7–23)
CALCIUM SERPL-MCNC: 9.8 MG/DL — SIGNIFICANT CHANGE UP (ref 8.4–10.5)
CHLORIDE SERPL-SCNC: 102 MMOL/L — SIGNIFICANT CHANGE UP (ref 96–108)
CO2 SERPL-SCNC: 26 MMOL/L — SIGNIFICANT CHANGE UP (ref 22–31)
CREAT SERPL-MCNC: 0.9 MG/DL — SIGNIFICANT CHANGE UP (ref 0.5–1.3)
EGFR: 80 ML/MIN/1.73M2 — SIGNIFICANT CHANGE UP
EOSINOPHIL # BLD AUTO: 0.21 K/UL — SIGNIFICANT CHANGE UP (ref 0–0.5)
EOSINOPHIL NFR BLD AUTO: 3.2 % — SIGNIFICANT CHANGE UP (ref 0–6)
GLUCOSE SERPL-MCNC: 96 MG/DL — SIGNIFICANT CHANGE UP (ref 70–99)
HCT VFR BLD CALC: 41.1 % — SIGNIFICANT CHANGE UP (ref 34.5–45)
HGB BLD-MCNC: 14 G/DL — SIGNIFICANT CHANGE UP (ref 11.5–15.5)
IMM GRANULOCYTES NFR BLD AUTO: 0.5 % — SIGNIFICANT CHANGE UP (ref 0–0.9)
LYMPHOCYTES # BLD AUTO: 1.88 K/UL — SIGNIFICANT CHANGE UP (ref 1–3.3)
LYMPHOCYTES # BLD AUTO: 28.8 % — SIGNIFICANT CHANGE UP (ref 13–44)
MCHC RBC-ENTMCNC: 31.2 PG — SIGNIFICANT CHANGE UP (ref 27–34)
MCHC RBC-ENTMCNC: 34.1 G/DL — SIGNIFICANT CHANGE UP (ref 32–36)
MCV RBC AUTO: 91.5 FL — SIGNIFICANT CHANGE UP (ref 80–100)
MONOCYTES # BLD AUTO: 0.42 K/UL — SIGNIFICANT CHANGE UP (ref 0–0.9)
MONOCYTES NFR BLD AUTO: 6.4 % — SIGNIFICANT CHANGE UP (ref 2–14)
NEUTROPHILS # BLD AUTO: 3.93 K/UL — SIGNIFICANT CHANGE UP (ref 1.8–7.4)
NEUTROPHILS NFR BLD AUTO: 60.3 % — SIGNIFICANT CHANGE UP (ref 43–77)
NRBC # BLD: 0 /100 WBCS — SIGNIFICANT CHANGE UP (ref 0–0)
PLATELET # BLD AUTO: 319 K/UL — SIGNIFICANT CHANGE UP (ref 150–400)
POTASSIUM SERPL-MCNC: 3.8 MMOL/L — SIGNIFICANT CHANGE UP (ref 3.5–5.3)
POTASSIUM SERPL-SCNC: 3.8 MMOL/L — SIGNIFICANT CHANGE UP (ref 3.5–5.3)
PROT SERPL-MCNC: 7.4 G/DL — SIGNIFICANT CHANGE UP (ref 6–8.3)
RBC # BLD: 4.49 M/UL — SIGNIFICANT CHANGE UP (ref 3.8–5.2)
RBC # FLD: 12.5 % — SIGNIFICANT CHANGE UP (ref 10.3–14.5)
SODIUM SERPL-SCNC: 138 MMOL/L — SIGNIFICANT CHANGE UP (ref 135–145)
WBC # BLD: 6.52 K/UL — SIGNIFICANT CHANGE UP (ref 3.8–10.5)
WBC # FLD AUTO: 6.52 K/UL — SIGNIFICANT CHANGE UP (ref 3.8–10.5)

## 2024-03-11 PROCEDURE — G2211 COMPLEX E/M VISIT ADD ON: CPT

## 2024-03-11 PROCEDURE — 99214 OFFICE O/P EST MOD 30 MIN: CPT

## 2024-03-11 RX ORDER — SODIUM PICOSULFATE, MAGNESIUM OXIDE, AND ANHYDROUS CITRIC ACID 12; 3.5; 1 G/175ML; G/175ML; MG/175ML
10-3.5-12 MG-GM LIQUID ORAL
Qty: 1 | Refills: 0 | Status: DISCONTINUED | COMMUNITY
Start: 2023-10-16 | End: 2024-03-11

## 2024-03-12 LAB
A1C WITH ESTIMATED AVERAGE GLUCOSE RESULT: 5.5 % — SIGNIFICANT CHANGE UP (ref 4–5.6)
ESTIMATED AVERAGE GLUCOSE: 111 MG/DL — SIGNIFICANT CHANGE UP (ref 68–114)
T4 FREE+ TSH PNL SERPL: 1.83 UIU/ML — SIGNIFICANT CHANGE UP (ref 0.27–4.2)

## 2024-03-14 PROBLEM — G62.0 CHEMOTHERAPY-INDUCED NEUROPATHY: Status: ACTIVE | Noted: 2022-07-29

## 2024-03-14 PROBLEM — I82.409 DVT (DEEP VENOUS THROMBOSIS): Status: ACTIVE | Noted: 2022-06-16

## 2024-03-14 PROBLEM — M25.50 AROMATASE INHIBITOR-ASSOCIATED ARTHRALGIA: Status: ACTIVE | Noted: 2023-05-09

## 2024-03-14 PROBLEM — C50.912 BREAST CANCER, LEFT: Status: ACTIVE | Noted: 2022-05-05

## 2024-03-14 NOTE — PHYSICAL EXAM
[de-identified] : s/p Bilateral mastectomies with AMINA flap reconstruction. No chest wall masses or lymphadenopathy [de-identified] : Left sided incisional hernia, reducible on examination.

## 2024-03-14 NOTE — HISTORY OF PRESENT ILLNESS
[de-identified] : Left breast cancer diagnosed at the age of 46  1/22 The patient noted a mass in her left breast 01/11/22 Mammogram and US revealed an indeterminate 2.1 cm mass in the left breast 1 o'clock axis 10 cm FN. TREVON in right breast. 01/13/22 Left breast 1 o'clock axis US guided core biopsy: a 0.8 cm IDC, SBR 6/9, ER >90%, WI 73%, HER-2 negative, Ki-67 90% and P53 5% 01/25/22 [facility] Next panel of 36 gene negative for pathogenic variants 02/01/22 MRI of the breast revealed focal non mass enhancement in the right breast 9 o'clock axis 9 cm FN for which MRI guided biopsy was recommended. Enhancing mass in the right breast 7 o'clock axis 6 cm FN, for which MRI guided core biopsy  was recommended. Biopsy proven malignancy in the left breast 1 o'clock axis for which surgical and oncologic management is recommended. Questionably prominent left axillary LN, for which targeted US is recommended.  02/11/22 Oncotype DX breast recurrence score = 51 with predicted risk of distant recurrence at 9 years of >39% with Tamoxifen alone. Absolute chemotherapy benefit of >15% 02/17/22 Left axillary US showed normal left axillary lymph nodes. 03/14/22 Bilateral mastectomies by Dr. Lissa Mederos and AMINA flap reconstruction with Dr. Ramirez and Dr. Nas Millan     Left mastectomy: 2.1 x 1.8 x 1.5 cm IDC, SBR 9/9 with 0/6 SLN involved and LVI identified     Right mastectomy: Fibrocystic mastopathy, proliferative, with florid ductal hyperplasia and fibroadenomatoid change. Focal PASH with 0/2 LN 5/18/22 - 7/1/22 Adjuvant chemotherapy with Adriamycin (60 mg/m2) & Cytoxan (600 mg/m2) with Neulasta every 2 weeks x 4 cycles  7/14/22 - 8/26/22 Taxol (175 mg/m2) every 2 weeks x 4 cycles 6/16/22 DVT with occlusive thrombus within the right internal jugular and brachiocephalic veins and nonocclusive thrombus with the right subclavian vein associated with mediport. Xarelto given for 5 months prior to mediport removal. Follow up RUE doppler on 9/26/22 showed atretic IJ but all other veins widely patent. 7/14/22 Lupron started. 9/22 Exemestane started. 2/13/23 BSO with benign findings. 10/5/23 Zometa started every 6 months x 3 years for skeletal prophylaxis.   [de-identified] : 2.1 cm IDC, SBR 9/9, +LVI, 0/6 SLN, ER >90%, VA 73%, HER-2 negative, Ki-67 90%, P53 5%, Oncotype = 51 (RR 39%) [de-identified] : Zarina started adjuvant chemotherapy on 22 and completed it on 22. She started Lupron on 22 and then exemestane in . She then underwent BSO on 23 at [facility] with Dr. Pallavi Angel The hot flashes are better and are no longer waking her up at night. She still gets a few during the day. She reports progressively worsening arthralgia x 6 months, in her lower and upper extremities. She was on glucosamine chondroitin plus turmeric which was helpful initially with the arthralgia but not in the last few months. She had revision surgery on 23 and is doing well. She will get the nipple tattoos sometime this year, initially planned for 2024 She is s/p colonoscopy with Dr. Linares who also informed the patient that she has a left incision hernia which is not incarcerated /strangulated. She is scheduled to see a surgeon for further evaluation and management of the hernia. She is  with two children in 3rd and 7th grades. She is a [employer] .  HEALTH MAINTENANCE: PCP: Krys Ayala MD GYN: Pallavi Angel MD Mammogram: s/p bilateral mastectomies Pap smear:  negative Bone density: baseline 10/5/23 showed T scores of 1.0 in spine, -1.2 in femoral neck and -1.0 in total hip Colonoscopy: Referred to see Dr. Linares and has appt 23 sessile serrated adenoma in the cecum and hyperplastic polyp in the sigmoid colon.  Having another colonoscopy  at the end of 3/2024 to assure complete resection of the polyp. Genetic testin22 [employer] [facility] Next gene panel with 36 genes negative for pathogenic variants

## 2024-03-29 ENCOUNTER — APPOINTMENT (OUTPATIENT)
Dept: GASTROENTEROLOGY | Facility: AMBULATORY MEDICAL SERVICES | Age: 49
End: 2024-03-29
Payer: COMMERCIAL

## 2024-03-29 PROCEDURE — 45385 COLONOSCOPY W/LESION REMOVAL: CPT

## 2024-04-18 ENCOUNTER — RX RENEWAL (OUTPATIENT)
Age: 49
End: 2024-04-18

## 2024-04-18 RX ORDER — EXEMESTANE 25 MG/1
25 TABLET, FILM COATED ORAL
Qty: 90 | Refills: 1 | Status: ACTIVE | COMMUNITY
Start: 2022-08-26 | End: 1900-01-01

## 2024-05-28 ENCOUNTER — APPOINTMENT (OUTPATIENT)
Dept: BREAST CENTER | Facility: CLINIC | Age: 49
End: 2024-05-28
Payer: COMMERCIAL

## 2024-05-28 VITALS
WEIGHT: 206 LBS | HEART RATE: 79 BPM | DIASTOLIC BLOOD PRESSURE: 88 MMHG | BODY MASS INDEX: 31.22 KG/M2 | HEIGHT: 68 IN | SYSTOLIC BLOOD PRESSURE: 135 MMHG

## 2024-05-28 DIAGNOSIS — Z85.3 ENCOUNTER FOR FOLLOW-UP EXAMINATION AFTER COMPLETED TREATMENT FOR MALIGNANT NEOPLASM: ICD-10-CM

## 2024-05-28 DIAGNOSIS — Z08 ENCOUNTER FOR FOLLOW-UP EXAMINATION AFTER COMPLETED TREATMENT FOR MALIGNANT NEOPLASM: ICD-10-CM

## 2024-05-28 PROCEDURE — 99214 OFFICE O/P EST MOD 30 MIN: CPT

## 2024-05-28 RX ORDER — CALCIUM CITRATE/VITAMIN D3 315MG-6.25
TABLET ORAL
Refills: 0 | Status: ACTIVE | COMMUNITY

## 2024-05-28 RX ORDER — LETROZOLE TABLETS 2.5 MG/1
2.5 TABLET, FILM COATED ORAL DAILY
Qty: 30 | Refills: 5 | Status: DISCONTINUED | COMMUNITY
Start: 2024-03-11 | End: 2024-05-28

## 2024-05-28 RX ORDER — CHOLECALCIFEROL (VITAMIN D3) 50 MCG
50 MCG TABLET ORAL
Qty: 30 | Refills: 0 | Status: DISCONTINUED | COMMUNITY
Start: 2023-05-09 | End: 2024-05-28

## 2024-05-28 NOTE — DATA REVIEWED
[FreeTextEntry1] : I have independently reviewed the reports and the images.   US needle bx 1/13/22 - L breast 1:00, q clip = infiltrating ductal carcinoma , 6/9, ER 90%, FL 73%, Her2 0  Surgical pathology 3/14/22 - R mastectomy is negative; 0/2 R sln - L mastectomy = 2.1 cm tumor; 0/2 L sln

## 2024-05-28 NOTE — CONSULT LETTER
[Dear  ___] : Dear  [unfilled], [Courtesy Letter:] : I had the pleasure of seeing your patient, [unfilled], in my office today. [Please see my note below.] : Please see my note below. [Consult Closing:] : Thank you very much for allowing me to participate in the care of this patient.  If you have any questions, please do not hesitate to contact me. [Sincerely,] : Sincerely, [DrTutu  ___] : Dr. KNIGHT [FreeTextEntry3] : Lissa Mederos MD FACS

## 2024-05-28 NOTE — ASSESSMENT
[FreeTextEntry1] :  Ms. Colby is a 49 year old woman 2 yrs s/p b/l mastectomy, sentinel node biopsy for a stage II left infiltrating ductal carcinoma. Her breast exam today is without suspicious findings. I would like to see her back for a follow-up in 6 months. She understands and is comfortable with the plan. She is encouraged to call if any questions or concerns arise.

## 2024-05-28 NOTE — PHYSICAL EXAM
[Normocephalic] : normocephalic [Sclera nonicteric] : sclera nonicteric [Supple] : supple [No Supraclavicular Adenopathy] : no supraclavicular adenopathy [No Cervical Adenopathy] : no cervical adenopathy [Clear to Auscultation Bilat] : clear to auscultation bilaterally [Normal Sinus Rhythm] : normal sinus rhythm [Examined in the supine and seated position] : examined in the supine and seated position [No dominant masses] : no dominant masses in right breast  [No dominant masses] : no dominant masses left breast [No Axillary Lymphadenopathy] : no left axillary lymphadenopathy [No Edema] : no edema [No Rashes] : no rashes [No Ulceration] : no ulceration [de-identified] : Circumareolar scar with vertical extension. AMINA flap. Reconstructed nipple [de-identified] : Circumareolar scar with vertical extension. AMINA flap. Reconstructed nipple [de-identified] : Transverse scar

## 2024-05-28 NOTE — HISTORY OF PRESENT ILLNESS
[FreeTextEntry1] : Ms. Colby is a 49 year old woman here for a follow-up for surveillance for breast cancer. Her breast history is significant for  1.) Left infiltrating ductal carcinoma, diagnosed in 2022 at age 47, pathologic stage II, pT2 pN0, 6/9, ER 90%, IA 73%, Her2 0 - s/p b/l mastectomy, sentinel node biopsy (3/14/22) with AMINA reconstruction (Dr. Ramirez) = 2.1 cm L tumor, 0/2 L sln - Oncotype score is 51; s/p adjuvant chemotherapy (ddAC-T, Dr. Forde)  - s/p BSO; on exemestane  She has no breast lumps. Some soreness by the left axillary tail. She has stiffness by the ankles and elbow and is thinking about switching from exemestane to letrozole later. She has a hernia surgery coming up, as well as her students' state science fair competition.  Her genetic panel testing is negative. Her family history is not significant for any breast cancer.

## 2024-08-05 NOTE — HISTORY OF PRESENT ILLNESS
Spine surgery folder, Chlorhexidine gluconate liquid soap and instructions given and reviewed. Consent reviewed and signed. Pre-op MSSA/MRSA swab performed and patient will be notified of results in 1-2 business days. Patient verbalizes understanding and will call with any further questions or concerns. Patient will need re-enforcement teaching at time of surgery.   
[FreeTextEntry1] : Ms. NIKKO KOVACS  is a 46 year  old female  with no pertinent past medical history who presents with newly diagnosed left breast cancer which was found on self exam in November.  Pt had mammogram 1/11/22, and later confirmed IDC with ultrasound guided core biopsy 1/13/22  pt was referred to the office by Dr Mederos  \par \par pt is now s/p b/l skin sparing mastectomy with reconstruction using AMINA flap and alloderm 3/14/22\par pt doing well\par pt has been applying bacitracin to right breast ecchymotic area daily\par pt saw Dr. Mederos on 3/22/22 and she removed her right abdominal drain, she was also placed on antibiotics due to redness in her breast skin flap\par Denies fever, chills, LE pain/edema\par

## 2024-08-21 ENCOUNTER — APPOINTMENT (OUTPATIENT)
Dept: INFUSION THERAPY | Facility: HOSPITAL | Age: 49
End: 2024-08-21

## 2024-09-06 ENCOUNTER — OUTPATIENT (OUTPATIENT)
Dept: OUTPATIENT SERVICES | Facility: HOSPITAL | Age: 49
LOS: 1 days | Discharge: HOME | End: 2024-09-06

## 2024-09-06 DIAGNOSIS — Z98.890 OTHER SPECIFIED POSTPROCEDURAL STATES: Chronic | ICD-10-CM

## 2024-09-06 DIAGNOSIS — Z90.12 ACQUIRED ABSENCE OF LEFT BREAST AND NIPPLE: Chronic | ICD-10-CM

## 2024-09-06 DIAGNOSIS — Z90.722 ACQUIRED ABSENCE OF OVARIES, BILATERAL: Chronic | ICD-10-CM

## 2024-09-06 DIAGNOSIS — C50.919 MALIGNANT NEOPLASM OF UNSPECIFIED SITE OF UNSPECIFIED FEMALE BREAST: ICD-10-CM

## 2024-09-06 DIAGNOSIS — K08.409 PARTIAL LOSS OF TEETH, UNSPECIFIED CAUSE, UNSPECIFIED CLASS: Chronic | ICD-10-CM

## 2024-09-06 DIAGNOSIS — Z90.11 ACQUIRED ABSENCE OF RIGHT BREAST AND NIPPLE: Chronic | ICD-10-CM

## 2024-09-11 ENCOUNTER — NON-APPOINTMENT (OUTPATIENT)
Age: 49
End: 2024-09-11

## 2024-09-12 ENCOUNTER — APPOINTMENT (OUTPATIENT)
Dept: HEMATOLOGY ONCOLOGY | Facility: CLINIC | Age: 49
End: 2024-09-12
Payer: COMMERCIAL

## 2024-09-12 ENCOUNTER — APPOINTMENT (OUTPATIENT)
Dept: INFUSION THERAPY | Facility: HOSPITAL | Age: 49
End: 2024-09-12

## 2024-09-12 ENCOUNTER — RESULT REVIEW (OUTPATIENT)
Age: 49
End: 2024-09-12

## 2024-09-12 VITALS
WEIGHT: 209.86 LBS | SYSTOLIC BLOOD PRESSURE: 125 MMHG | HEART RATE: 73 BPM | TEMPERATURE: 97.9 F | BODY MASS INDEX: 31.91 KG/M2 | OXYGEN SATURATION: 98 % | RESPIRATION RATE: 16 BRPM | DIASTOLIC BLOOD PRESSURE: 82 MMHG

## 2024-09-12 DIAGNOSIS — I82.409 ACUTE EMBOLISM AND THROMBOSIS OF UNSPECIFIED DEEP VEINS OF UNSPECIFIED LOWER EXTREMITY: ICD-10-CM

## 2024-09-12 DIAGNOSIS — T45.1X5A DRUG-INDUCED POLYNEUROPATHY: ICD-10-CM

## 2024-09-12 DIAGNOSIS — C50.912 MALIGNANT NEOPLASM OF UNSPECIFIED SITE OF LEFT FEMALE BREAST: ICD-10-CM

## 2024-09-12 DIAGNOSIS — T45.1X5A PAIN IN UNSPECIFIED JOINT: ICD-10-CM

## 2024-09-12 DIAGNOSIS — G62.0 DRUG-INDUCED POLYNEUROPATHY: ICD-10-CM

## 2024-09-12 DIAGNOSIS — M25.50 PAIN IN UNSPECIFIED JOINT: ICD-10-CM

## 2024-09-12 LAB
ALBUMIN SERPL ELPH-MCNC: 4.5 G/DL — SIGNIFICANT CHANGE UP (ref 3.3–5)
ALP SERPL-CCNC: 101 U/L — SIGNIFICANT CHANGE UP (ref 40–120)
ALT FLD-CCNC: 20 U/L — SIGNIFICANT CHANGE UP (ref 10–45)
ANION GAP SERPL CALC-SCNC: 13 MMOL/L — SIGNIFICANT CHANGE UP (ref 5–17)
AST SERPL-CCNC: 25 U/L — SIGNIFICANT CHANGE UP (ref 10–40)
BASOPHILS # BLD AUTO: 0.05 K/UL — SIGNIFICANT CHANGE UP (ref 0–0.2)
BASOPHILS NFR BLD AUTO: 0.8 % — SIGNIFICANT CHANGE UP (ref 0–2)
BILIRUB SERPL-MCNC: 0.4 MG/DL — SIGNIFICANT CHANGE UP (ref 0.2–1.2)
BUN SERPL-MCNC: 10 MG/DL — SIGNIFICANT CHANGE UP (ref 7–23)
CALCIUM SERPL-MCNC: 10.1 MG/DL — SIGNIFICANT CHANGE UP (ref 8.4–10.5)
CHLORIDE SERPL-SCNC: 104 MMOL/L — SIGNIFICANT CHANGE UP (ref 96–108)
CO2 SERPL-SCNC: 23 MMOL/L — SIGNIFICANT CHANGE UP (ref 22–31)
CREAT SERPL-MCNC: 0.88 MG/DL — SIGNIFICANT CHANGE UP (ref 0.5–1.3)
EGFR: 82 ML/MIN/1.73M2 — SIGNIFICANT CHANGE UP
EOSINOPHIL # BLD AUTO: 0.2 K/UL — SIGNIFICANT CHANGE UP (ref 0–0.5)
EOSINOPHIL NFR BLD AUTO: 3.2 % — SIGNIFICANT CHANGE UP (ref 0–6)
GLUCOSE SERPL-MCNC: 87 MG/DL — SIGNIFICANT CHANGE UP (ref 70–99)
HCT VFR BLD CALC: 44.2 % — SIGNIFICANT CHANGE UP (ref 34.5–45)
HGB BLD-MCNC: 14.8 G/DL — SIGNIFICANT CHANGE UP (ref 11.5–15.5)
IMM GRANULOCYTES NFR BLD AUTO: 0.3 % — SIGNIFICANT CHANGE UP (ref 0–0.9)
LYMPHOCYTES # BLD AUTO: 1.73 K/UL — SIGNIFICANT CHANGE UP (ref 1–3.3)
LYMPHOCYTES # BLD AUTO: 28 % — SIGNIFICANT CHANGE UP (ref 13–44)
MCHC RBC-ENTMCNC: 31.2 PG — SIGNIFICANT CHANGE UP (ref 27–34)
MCHC RBC-ENTMCNC: 33.5 G/DL — SIGNIFICANT CHANGE UP (ref 32–36)
MCV RBC AUTO: 93.2 FL — SIGNIFICANT CHANGE UP (ref 80–100)
MONOCYTES # BLD AUTO: 0.46 K/UL — SIGNIFICANT CHANGE UP (ref 0–0.9)
MONOCYTES NFR BLD AUTO: 7.4 % — SIGNIFICANT CHANGE UP (ref 2–14)
NEUTROPHILS # BLD AUTO: 3.72 K/UL — SIGNIFICANT CHANGE UP (ref 1.8–7.4)
NEUTROPHILS NFR BLD AUTO: 60.3 % — SIGNIFICANT CHANGE UP (ref 43–77)
NRBC # BLD: 0 /100 WBCS — SIGNIFICANT CHANGE UP (ref 0–0)
NRBC BLD-RTO: 0 /100 WBCS — SIGNIFICANT CHANGE UP (ref 0–0)
PLATELET # BLD AUTO: 301 K/UL — SIGNIFICANT CHANGE UP (ref 150–400)
POTASSIUM SERPL-MCNC: 4.2 MMOL/L — SIGNIFICANT CHANGE UP (ref 3.5–5.3)
POTASSIUM SERPL-SCNC: 4.2 MMOL/L — SIGNIFICANT CHANGE UP (ref 3.5–5.3)
PROT SERPL-MCNC: 7.8 G/DL — SIGNIFICANT CHANGE UP (ref 6–8.3)
RBC # BLD: 4.74 M/UL — SIGNIFICANT CHANGE UP (ref 3.8–5.2)
RBC # FLD: 12.2 % — SIGNIFICANT CHANGE UP (ref 10.3–14.5)
SODIUM SERPL-SCNC: 139 MMOL/L — SIGNIFICANT CHANGE UP (ref 135–145)
WBC # BLD: 6.18 K/UL — SIGNIFICANT CHANGE UP (ref 3.8–10.5)
WBC # FLD AUTO: 6.18 K/UL — SIGNIFICANT CHANGE UP (ref 3.8–10.5)

## 2024-09-12 PROCEDURE — G2211 COMPLEX E/M VISIT ADD ON: CPT | Mod: NC

## 2024-09-12 PROCEDURE — 99214 OFFICE O/P EST MOD 30 MIN: CPT

## 2024-09-12 NOTE — PHYSICAL EXAM
[Fully active, able to carry on all pre-disease performance without restriction] : Status 0 - Fully active, able to carry on all pre-disease performance without restriction [Normal] : affect appropriate [de-identified] : s/p Bilateral mastectomies with AMINA flap reconstruction. No chest wall masses or lymphadenopathy [de-identified] : Left sided incisional hernia,repaired. Healed laparoscopic incisions

## 2024-09-12 NOTE — HISTORY OF PRESENT ILLNESS
[Disease: _____________________] : Disease: [unfilled] [T: ___] : T[unfilled] [N: ___] : N[unfilled] [M: ___] : M[unfilled] [AJCC Stage: ____] : AJCC Stage: [unfilled] [de-identified] : Left breast cancer diagnosed at the age of 46  1/22 The patient noted a mass in her left breast 01/11/22 Mammogram and US revealed an indeterminate 2.1 cm mass in the left breast 1 o'clock axis 10 cm FN. TREVON in right breast. 01/13/22 Left breast 1 o'clock axis US guided core biopsy: a 0.8 cm IDC, SBR 6/9, ER >90%, GA 73%, HER-2 negative, Ki-67 90% and P53 5% 01/25/22 [facility] [employer] [facility] Next panel of 36 gene negative for pathogenic variants 02/01/22 MRI of the breast revealed focal non mass enhancement in the right breast 9 o'clock axis 9 cm FN for which MRI guided biopsy was recommended. Enhancing mass in the right breast 7 o'clock axis 6 cm FN, for which MRI guided core biopsy  was recommended. Biopsy proven malignancy in the left breast 1 o'clock axis for which surgical and oncologic management is recommended. Questionably prominent left axillary LN, for which targeted US is recommended.  02/11/22 Oncotype DX breast recurrence score = 51 with predicted risk of distant recurrence at 9 years of >39% with Tamoxifen alone. Absolute chemotherapy benefit of >15% 02/17/22 Left axillary US showed normal left axillary lymph nodes. 03/14/22 Bilateral mastectomies by Dr. Lissa Mederos and AMINA flap reconstruction with Dr. Ramirez and Dr. Nas Millan     Left mastectomy: 2.1 x 1.8 x 1.5 cm IDC, SBR 9/9 with 0/6 SLN involved and LVI identified     Right mastectomy: Fibrocystic mastopathy, proliferative, with florid ductal hyperplasia and fibroadenomatoid change. Focal PASH with 0/2 LN 5/18/22 - 7/1/22 Adjuvant chemotherapy with Adriamycin (60 mg/m2) & Cytoxan (600 mg/m2) with Neulasta every 2 weeks x 4 cycles  7/14/22 - 8/26/22 Taxol (175 mg/m2) every 2 weeks x 4 cycles 6/16/22 DVT with occlusive thrombus within the right internal jugular and brachiocephalic veins and nonocclusive thrombus with the right subclavian vein associated with mediport. Xarelto given for 5 months prior to mediport removal. Follow up RUE doppler on 9/26/22 showed atretic IJ but all other veins widely patent. 7/14/22 Lupron started. 9/22 Exemestane started. 2/13/23 BSO with benign findings. 10/5/23 Zometa started every 6 months x 3 years for skeletal prophylaxis.   [de-identified] : 2.1 cm IDC, SBR 9/9, +LVI, 0/6 SLN, ER >90%, SD 73%, HER-2 negative, Ki-67 90%, P53 5%, Oncotype = 51 (RR 39%) [de-identified] : Zarina started adjuvant chemotherapy on 22 and completed it on 22. She started Lupron on 22 and then exemestane in 2022. She then underwent BSO on 23 at [facility] with Dr. Pallavi Angel The hot flashes are better and are no longer waking her up at night. She still gets a few during the day. She has reported a gradual increase in arthralgia, affecting both her lower and upper extremities. Initially, she found relief from glucosamine chondroitin plus turmeric, but the pain returned after some time. In 2024, she was prescribed letrozole, but did not take and continued to take exemestane instead. She decided to continue with exemestane as she no longer felt pain, only some stiffness after sitting for extended periods, which was manageable. She underwent incisional hernia repair at [facility] on 2024, and has fully recovered. She underwent revision surgery on 2323, and the nipple tattooing has been postponed due to an incisional hernia repair. It is likely that she will have the procedure completed sometime next year. She is s/p colonoscopy with Dr. Linares who also informed the patient that she has a left incision hernia which is not incarcerated /strangulated. She is scheduled to see a surgeon for further evaluation and management of the hernia. She is  with two children in 4rd and 8th grades. She is a HS .  HEALTH MAINTENANCE: PCP: Krys Ayala MD GYN: Pallavi Angel MD Mammogram: s/p bilateral mastectomies Pap smear:  negative Bone density: baseline 10/5/23 showed T scores of 1.0 in spine, -1.2 in femoral neck and -1.0 in total hip Colonoscopy:  Dr. Linares and has appt 23 sessile serrated adenoma in the cecum and hyperplastic polyp in the sigmoid colon.  Having another colonoscopy  at the end of 3/2024 to assure complete resection of the polyp. showed polyp 2 mm to 4mm in the ascending colon ( sessile serrated adenoma) and scar in the cecum was noted Genetic testin22 [employer] [facility] Next gene panel with 36 genes negative for pathogenic variants

## 2024-09-12 NOTE — HISTORY OF PRESENT ILLNESS
[Disease: _____________________] : Disease: [unfilled] [T: ___] : T[unfilled] [N: ___] : N[unfilled] [M: ___] : M[unfilled] [AJCC Stage: ____] : AJCC Stage: [unfilled] [de-identified] : Left breast cancer diagnosed at the age of 46  1/22 The patient noted a mass in her left breast 01/11/22 Mammogram and US revealed an indeterminate 2.1 cm mass in the left breast 1 o'clock axis 10 cm FN. TREVON in right breast. 01/13/22 Left breast 1 o'clock axis US guided core biopsy: a 0.8 cm IDC, SBR 6/9, ER >90%, AL 73%, HER-2 negative, Ki-67 90% and P53 5% 01/25/22 [facility] Next panel of 36 gene negative for pathogenic variants 02/01/22 MRI of the breast revealed focal non mass enhancement in the right breast 9 o'clock axis 9 cm FN for which MRI guided biopsy was recommended. Enhancing mass in the right breast 7 o'clock axis 6 cm FN, for which MRI guided core biopsy  was recommended. Biopsy proven malignancy in the left breast 1 o'clock axis for which surgical and oncologic management is recommended. Questionably prominent left axillary LN, for which targeted US is recommended.  02/11/22 Oncotype DX breast recurrence score = 51 with predicted risk of distant recurrence at 9 years of >39% with Tamoxifen alone. Absolute chemotherapy benefit of >15% 02/17/22 Left axillary US showed normal left axillary lymph nodes. 03/14/22 Bilateral mastectomies by Dr. Lissa Mederos and AMINA flap reconstruction with Dr. Ramirez and Dr. Nas Millan     Left mastectomy: 2.1 x 1.8 x 1.5 cm IDC, SBR 9/9 with 0/6 SLN involved and LVI identified     Right mastectomy: Fibrocystic mastopathy, proliferative, with florid ductal hyperplasia and fibroadenomatoid change. Focal PASH with 0/2 LN 5/18/22 - 7/1/22 Adjuvant chemotherapy with Adriamycin (60 mg/m2) & Cytoxan (600 mg/m2) with Neulasta every 2 weeks x 4 cycles  7/14/22 - 8/26/22 Taxol (175 mg/m2) every 2 weeks x 4 cycles 6/16/22 DVT with occlusive thrombus within the right internal jugular and brachiocephalic veins and nonocclusive thrombus with the right subclavian vein associated with mediport. Xarelto given for 5 months prior to mediport removal. Follow up RUE doppler on 9/26/22 showed atretic IJ but all other veins widely patent. 7/14/22 Lupron started. 9/22 Exemestane started. 2/13/23 BSO with benign findings. 10/5/23 Zometa started every 6 months x 3 years for skeletal prophylaxis.   [de-identified] : 2.1 cm IDC, SBR 9/9, +LVI, 0/6 SLN, ER >90%, ME 73%, HER-2 negative, Ki-67 90%, P53 5%, Oncotype = 51 (RR 39%) [de-identified] : Zarina started adjuvant chemotherapy on 22 and completed it on 22. She started Lupron on 22 and then exemestane in 2022. She then underwent BSO on 23 at [facility] with Dr. Pallavi Angel The hot flashes are better and are no longer waking her up at night. She still gets a few during the day. She has reported a gradual increase in arthralgia, affecting both her lower and upper extremities. Initially, she found relief from glucosamine chondroitin plus turmeric, but the pain returned after some time. In 2024, she was prescribed letrozole, but did not take and continued to take exemestane instead. She decided to continue with exemestane as she no longer felt pain, only some stiffness after sitting for extended periods, which was manageable. She underwent incisional hernia repair at [facility] on 2024, and has fully recovered. She underwent revision surgery on 2323, and the nipple tattooing has been postponed due to an incisional hernia repair. It is likely that she will have the procedure completed sometime next year. She is s/p colonoscopy with Dr. Linares who also informed the patient that she has a left incision hernia which is not incarcerated /strangulated. She is scheduled to see a surgeon for further evaluation and management of the hernia. She is  with two children in 4rd and 8th grades. She is a HS .  HEALTH MAINTENANCE: PCP: Krys Ayala MD GYN: Pallavi Angel MD Mammogram: s/p bilateral mastectomies Pap smear:  negative Bone density: baseline 10/5/23 showed T scores of 1.0 in spine, -1.2 in femoral neck and -1.0 in total hip Colonoscopy:  Dr. Linares and has appt 23 sessile serrated adenoma in the cecum and hyperplastic polyp in the sigmoid colon.  Having another colonoscopy  at the end of 3/2024 to assure complete resection of the polyp. showed polyp 2 mm to 4mm in the ascending colon ( sessile serrated adenoma) and scar in the cecum was noted Genetic testin22 [employer] Cancer Next gene panel with 36 genes negative for pathogenic variants

## 2024-09-12 NOTE — PHYSICAL EXAM
[Fully active, able to carry on all pre-disease performance without restriction] : Status 0 - Fully active, able to carry on all pre-disease performance without restriction [Normal] : affect appropriate [de-identified] : s/p Bilateral mastectomies with AMINA flap reconstruction. No chest wall masses or lymphadenopathy [de-identified] : Left sided incisional hernia,repaired. Healed laparoscopic incisions

## 2024-09-13 LAB
A1C WITH ESTIMATED AVERAGE GLUCOSE RESULT: 5.5 % — SIGNIFICANT CHANGE UP (ref 4–5.6)
CHOLEST SERPL-MCNC: 224 MG/DL — HIGH
ESTIMATED AVERAGE GLUCOSE: 111 MG/DL — SIGNIFICANT CHANGE UP (ref 68–114)
HDLC SERPL-MCNC: 39 MG/DL — LOW
LIPID PNL WITH DIRECT LDL SERPL: 168 MG/DL — HIGH
NON HDL CHOLESTEROL: 186 MG/DL — HIGH
T4 FREE+ TSH PNL SERPL: 1.62 UIU/ML — SIGNIFICANT CHANGE UP (ref 0.27–4.2)
TRIGL SERPL-MCNC: 96 MG/DL — SIGNIFICANT CHANGE UP

## 2024-10-17 ENCOUNTER — RX RENEWAL (OUTPATIENT)
Age: 49
End: 2024-10-17

## 2024-12-02 ENCOUNTER — APPOINTMENT (OUTPATIENT)
Dept: BREAST CENTER | Facility: CLINIC | Age: 49
End: 2024-12-02
Payer: COMMERCIAL

## 2024-12-02 VITALS
DIASTOLIC BLOOD PRESSURE: 85 MMHG | HEART RATE: 81 BPM | BODY MASS INDEX: 31.22 KG/M2 | WEIGHT: 206 LBS | HEIGHT: 68 IN | SYSTOLIC BLOOD PRESSURE: 123 MMHG

## 2024-12-02 DIAGNOSIS — Z85.3 ENCOUNTER FOR FOLLOW-UP EXAMINATION AFTER COMPLETED TREATMENT FOR MALIGNANT NEOPLASM: ICD-10-CM

## 2024-12-02 DIAGNOSIS — Z08 ENCOUNTER FOR FOLLOW-UP EXAMINATION AFTER COMPLETED TREATMENT FOR MALIGNANT NEOPLASM: ICD-10-CM

## 2024-12-02 DIAGNOSIS — Z12.39 ENCOUNTER FOR OTHER SCREENING FOR MALIGNANT NEOPLASM OF BREAST: ICD-10-CM

## 2024-12-02 DIAGNOSIS — Z91.89 OTHER SPECIFIED PERSONAL RISK FACTORS, NOT ELSEWHERE CLASSIFIED: ICD-10-CM

## 2024-12-02 PROCEDURE — 93702 BIS XTRACELL FLUID ANALYSIS: CPT | Mod: NC

## 2024-12-02 PROCEDURE — 99214 OFFICE O/P EST MOD 30 MIN: CPT

## 2025-02-12 NOTE — ASU PATIENT PROFILE, ADULT - FALL HARM RISK - PT AGE POPULATION HIDDEN
[facility] Call Center    Phone Message    May a detailed message be left on voicemail: yes     Reason for Call: Other: Kenia is calling requesting more pool therapy for this patient. Please send orders to [facility].      Action Taken: Message routed to:  [facility] ([facility]    Travel Screening: Not Applicable                                                                 Adult

## 2025-03-04 ENCOUNTER — OUTPATIENT (OUTPATIENT)
Dept: OUTPATIENT SERVICES | Facility: HOSPITAL | Age: 50
LOS: 1 days | Discharge: HOME | End: 2025-03-04

## 2025-03-04 DIAGNOSIS — Z98.890 OTHER SPECIFIED POSTPROCEDURAL STATES: Chronic | ICD-10-CM

## 2025-03-04 DIAGNOSIS — Z90.722 ACQUIRED ABSENCE OF OVARIES, BILATERAL: Chronic | ICD-10-CM

## 2025-03-04 DIAGNOSIS — Z90.11 ACQUIRED ABSENCE OF RIGHT BREAST AND NIPPLE: Chronic | ICD-10-CM

## 2025-03-04 DIAGNOSIS — K08.409 PARTIAL LOSS OF TEETH, UNSPECIFIED CAUSE, UNSPECIFIED CLASS: Chronic | ICD-10-CM

## 2025-03-06 ENCOUNTER — RESULT REVIEW (OUTPATIENT)
Age: 50
End: 2025-03-06

## 2025-03-06 ENCOUNTER — APPOINTMENT (OUTPATIENT)
Dept: HEMATOLOGY ONCOLOGY | Facility: CLINIC | Age: 50
End: 2025-03-06
Payer: COMMERCIAL

## 2025-03-06 ENCOUNTER — APPOINTMENT (OUTPATIENT)
Dept: INFUSION THERAPY | Facility: HOSPITAL | Age: 50
End: 2025-03-06

## 2025-03-06 VITALS
OXYGEN SATURATION: 98 % | RESPIRATION RATE: 16 BRPM | WEIGHT: 206.13 LBS | SYSTOLIC BLOOD PRESSURE: 143 MMHG | DIASTOLIC BLOOD PRESSURE: 94 MMHG | BODY MASS INDEX: 31.34 KG/M2 | TEMPERATURE: 97.3 F | HEART RATE: 76 BPM

## 2025-03-06 DIAGNOSIS — T45.1X5A PAIN IN UNSPECIFIED JOINT: ICD-10-CM

## 2025-03-06 DIAGNOSIS — I82.409 ACUTE EMBOLISM AND THROMBOSIS OF UNSPECIFIED DEEP VEINS OF UNSPECIFIED LOWER EXTREMITY: ICD-10-CM

## 2025-03-06 DIAGNOSIS — T45.1X5A DRUG-INDUCED POLYNEUROPATHY: ICD-10-CM

## 2025-03-06 DIAGNOSIS — M25.50 PAIN IN UNSPECIFIED JOINT: ICD-10-CM

## 2025-03-06 DIAGNOSIS — G62.0 DRUG-INDUCED POLYNEUROPATHY: ICD-10-CM

## 2025-03-06 DIAGNOSIS — C50.912 MALIGNANT NEOPLASM OF UNSPECIFIED SITE OF LEFT FEMALE BREAST: ICD-10-CM

## 2025-03-06 LAB
ALBUMIN SERPL ELPH-MCNC: 4.4 G/DL — SIGNIFICANT CHANGE UP (ref 3.3–5)
ALP SERPL-CCNC: 91 U/L — SIGNIFICANT CHANGE UP (ref 40–120)
ALT FLD-CCNC: 13 U/L — SIGNIFICANT CHANGE UP (ref 10–45)
ANION GAP SERPL CALC-SCNC: 13 MMOL/L — SIGNIFICANT CHANGE UP (ref 5–17)
AST SERPL-CCNC: 20 U/L — SIGNIFICANT CHANGE UP (ref 10–40)
BASOPHILS # BLD AUTO: 0.03 K/UL — SIGNIFICANT CHANGE UP (ref 0–0.2)
BASOPHILS NFR BLD AUTO: 0.3 % — SIGNIFICANT CHANGE UP (ref 0–2)
BILIRUB SERPL-MCNC: 0.4 MG/DL — SIGNIFICANT CHANGE UP (ref 0.2–1.2)
BUN SERPL-MCNC: 13 MG/DL — SIGNIFICANT CHANGE UP (ref 7–23)
CALCIUM SERPL-MCNC: 9.5 MG/DL — SIGNIFICANT CHANGE UP (ref 8.4–10.5)
CHLORIDE SERPL-SCNC: 104 MMOL/L — SIGNIFICANT CHANGE UP (ref 96–108)
CO2 SERPL-SCNC: 23 MMOL/L — SIGNIFICANT CHANGE UP (ref 22–31)
CREAT SERPL-MCNC: 0.8 MG/DL — SIGNIFICANT CHANGE UP (ref 0.5–1.3)
EGFR: 91 ML/MIN/1.73M2 — SIGNIFICANT CHANGE UP
EGFR: 91 ML/MIN/1.73M2 — SIGNIFICANT CHANGE UP
EOSINOPHIL # BLD AUTO: 0.04 K/UL — SIGNIFICANT CHANGE UP (ref 0–0.5)
EOSINOPHIL NFR BLD AUTO: 0.4 % — SIGNIFICANT CHANGE UP (ref 0–6)
GLUCOSE SERPL-MCNC: 87 MG/DL — SIGNIFICANT CHANGE UP (ref 70–99)
HCT VFR BLD CALC: 42.5 % — SIGNIFICANT CHANGE UP (ref 34.5–45)
HGB BLD-MCNC: 14.4 G/DL — SIGNIFICANT CHANGE UP (ref 11.5–15.5)
IMM GRANULOCYTES NFR BLD AUTO: 0.5 % — SIGNIFICANT CHANGE UP (ref 0–0.9)
LYMPHOCYTES # BLD AUTO: 2.47 K/UL — SIGNIFICANT CHANGE UP (ref 1–3.3)
LYMPHOCYTES # BLD AUTO: 23.8 % — SIGNIFICANT CHANGE UP (ref 13–44)
MCHC RBC-ENTMCNC: 31.3 PG — SIGNIFICANT CHANGE UP (ref 27–34)
MCHC RBC-ENTMCNC: 33.9 G/DL — SIGNIFICANT CHANGE UP (ref 32–36)
MCV RBC AUTO: 92.4 FL — SIGNIFICANT CHANGE UP (ref 80–100)
MONOCYTES # BLD AUTO: 0.56 K/UL — SIGNIFICANT CHANGE UP (ref 0–0.9)
MONOCYTES NFR BLD AUTO: 5.4 % — SIGNIFICANT CHANGE UP (ref 2–14)
NEUTROPHILS # BLD AUTO: 7.22 K/UL — SIGNIFICANT CHANGE UP (ref 1.8–7.4)
NEUTROPHILS NFR BLD AUTO: 69.6 % — SIGNIFICANT CHANGE UP (ref 43–77)
NRBC BLD AUTO-RTO: 0 /100 WBCS — SIGNIFICANT CHANGE UP (ref 0–0)
PLATELET # BLD AUTO: 336 K/UL — SIGNIFICANT CHANGE UP (ref 150–400)
POTASSIUM SERPL-MCNC: 4.1 MMOL/L — SIGNIFICANT CHANGE UP (ref 3.5–5.3)
POTASSIUM SERPL-SCNC: 4.1 MMOL/L — SIGNIFICANT CHANGE UP (ref 3.5–5.3)
PROT SERPL-MCNC: 7.2 G/DL — SIGNIFICANT CHANGE UP (ref 6–8.3)
RBC # BLD: 4.6 M/UL — SIGNIFICANT CHANGE UP (ref 3.8–5.2)
RBC # FLD: 12.2 % — SIGNIFICANT CHANGE UP (ref 10.3–14.5)
SODIUM SERPL-SCNC: 140 MMOL/L — SIGNIFICANT CHANGE UP (ref 135–145)
WBC # BLD: 10.37 K/UL — SIGNIFICANT CHANGE UP (ref 3.8–10.5)
WBC # FLD AUTO: 10.37 K/UL — SIGNIFICANT CHANGE UP (ref 3.8–10.5)

## 2025-03-06 PROCEDURE — G2211 COMPLEX E/M VISIT ADD ON: CPT | Mod: NC

## 2025-03-06 PROCEDURE — 99214 OFFICE O/P EST MOD 30 MIN: CPT

## 2025-03-06 NOTE — ASU PREOP CHECKLIST - BOWEL PREP
previous_has_had_dysport Additional History: Pt states she had some come back for a touch up and she thinks that the Botox went away a lot quicker than before. She stated really shortly after it was gone. n/a

## 2025-03-07 LAB
CHOLEST SERPL-MCNC: 179 MG/DL — SIGNIFICANT CHANGE UP
HDLC SERPL-MCNC: 42 MG/DL — LOW
LDLC SERPL-MCNC: 124 MG/DL — HIGH
LIPID PNL WITH DIRECT LDL SERPL: 124 MG/DL — HIGH
NONHDLC SERPL-MCNC: 137 MG/DL — HIGH
TRIGL SERPL-MCNC: 73 MG/DL — SIGNIFICANT CHANGE UP

## 2025-03-10 PROBLEM — G62.0 CHEMOTHERAPY-INDUCED NEUROPATHY: Status: RESOLVED | Noted: 2022-07-29 | Resolved: 2025-03-10

## 2025-04-16 ENCOUNTER — RX RENEWAL (OUTPATIENT)
Age: 50
End: 2025-04-16

## 2025-06-03 ENCOUNTER — NON-APPOINTMENT (OUTPATIENT)
Age: 50
End: 2025-06-03

## 2025-06-03 ENCOUNTER — APPOINTMENT (OUTPATIENT)
Dept: BREAST CENTER | Facility: CLINIC | Age: 50
End: 2025-06-03
Payer: COMMERCIAL

## 2025-06-03 VITALS
DIASTOLIC BLOOD PRESSURE: 88 MMHG | HEART RATE: 75 BPM | HEIGHT: 68 IN | BODY MASS INDEX: 30.92 KG/M2 | SYSTOLIC BLOOD PRESSURE: 146 MMHG | WEIGHT: 204 LBS

## 2025-06-03 DIAGNOSIS — Z08 ENCOUNTER FOR FOLLOW-UP EXAMINATION AFTER COMPLETED TREATMENT FOR MALIGNANT NEOPLASM: ICD-10-CM

## 2025-06-03 DIAGNOSIS — Z85.3 ENCOUNTER FOR FOLLOW-UP EXAMINATION AFTER COMPLETED TREATMENT FOR MALIGNANT NEOPLASM: ICD-10-CM

## 2025-06-03 DIAGNOSIS — Z12.39 ENCOUNTER FOR OTHER SCREENING FOR MALIGNANT NEOPLASM OF BREAST: ICD-10-CM

## 2025-06-03 PROCEDURE — 99214 OFFICE O/P EST MOD 30 MIN: CPT

## 2025-08-27 ENCOUNTER — RESULT REVIEW (OUTPATIENT)
Age: 50
End: 2025-08-27

## 2025-08-27 ENCOUNTER — APPOINTMENT (OUTPATIENT)
Dept: HEMATOLOGY ONCOLOGY | Facility: CLINIC | Age: 50
End: 2025-08-27
Payer: COMMERCIAL

## 2025-08-27 ENCOUNTER — APPOINTMENT (OUTPATIENT)
Dept: INFUSION THERAPY | Facility: HOSPITAL | Age: 50
End: 2025-08-27

## 2025-08-27 VITALS — BODY MASS INDEX: 31.85 KG/M2 | WEIGHT: 209.44 LBS

## 2025-08-27 VITALS
RESPIRATION RATE: 16 BRPM | SYSTOLIC BLOOD PRESSURE: 127 MMHG | OXYGEN SATURATION: 98 % | TEMPERATURE: 97.3 F | DIASTOLIC BLOOD PRESSURE: 78 MMHG | HEART RATE: 78 BPM

## 2025-08-27 DIAGNOSIS — M25.50 PAIN IN UNSPECIFIED JOINT: ICD-10-CM

## 2025-08-27 DIAGNOSIS — T45.1X5A PAIN IN UNSPECIFIED JOINT: ICD-10-CM

## 2025-08-27 DIAGNOSIS — C44.729 SQUAMOUS CELL CARCINOMA OF SKIN OF LEFT LOWER LIMB, INCLUDING HIP: ICD-10-CM

## 2025-08-27 DIAGNOSIS — I82.409 ACUTE EMBOLISM AND THROMBOSIS OF UNSPECIFIED DEEP VEINS OF UNSPECIFIED LOWER EXTREMITY: ICD-10-CM

## 2025-08-27 DIAGNOSIS — C50.912 MALIGNANT NEOPLASM OF UNSPECIFIED SITE OF LEFT FEMALE BREAST: ICD-10-CM

## 2025-08-27 PROCEDURE — 99214 OFFICE O/P EST MOD 30 MIN: CPT

## 2025-08-27 PROCEDURE — G2211 COMPLEX E/M VISIT ADD ON: CPT | Mod: NC

## 2025-08-28 PROBLEM — C44.729 SQUAMOUS CELL CARCINOMA OF SKIN OF LEFT LOWER EXTREMITY: Status: ACTIVE | Noted: 2025-08-26
